# Patient Record
Sex: FEMALE | Race: WHITE | NOT HISPANIC OR LATINO | Employment: UNEMPLOYED | URBAN - METROPOLITAN AREA
[De-identification: names, ages, dates, MRNs, and addresses within clinical notes are randomized per-mention and may not be internally consistent; named-entity substitution may affect disease eponyms.]

---

## 2023-10-11 ENCOUNTER — OFFICE VISIT (OUTPATIENT)
Dept: FAMILY MEDICINE CLINIC | Facility: CLINIC | Age: 38
End: 2023-10-11
Payer: COMMERCIAL

## 2023-10-11 VITALS
OXYGEN SATURATION: 98 % | BODY MASS INDEX: 39.7 KG/M2 | DIASTOLIC BLOOD PRESSURE: 86 MMHG | WEIGHT: 247 LBS | TEMPERATURE: 99.2 F | HEART RATE: 90 BPM | HEIGHT: 66 IN | SYSTOLIC BLOOD PRESSURE: 126 MMHG

## 2023-10-11 DIAGNOSIS — J01.00 ACUTE NON-RECURRENT MAXILLARY SINUSITIS: Primary | ICD-10-CM

## 2023-10-11 PROCEDURE — 99213 OFFICE O/P EST LOW 20 MIN: CPT | Performed by: NURSE PRACTITIONER

## 2023-10-11 RX ORDER — AZITHROMYCIN 250 MG/1
TABLET, FILM COATED ORAL
Qty: 6 TABLET | Refills: 0 | Status: SHIPPED | OUTPATIENT
Start: 2023-10-11 | End: 2023-10-16

## 2023-10-11 NOTE — PROGRESS NOTES
[facility]        NAME: Angy Kennedy is a 41 y.o. female  : 1985    MRN: [id number]  DATE: 2023  TIME: 3:47 PM    Assessment and Plan   Acute non-recurrent maxillary sinusitis [J01.00]  1. Acute non-recurrent maxillary sinusitis  azithromycin (Zithromax) 250 mg tablet            Patient Instructions     Patient Instructions   Discussed viral vs allergic vs bacterial infections  OTC cough cold as directed  Allergy medication and Flonase as directed  Call or return if no improvement          Chief Complaint     Chief Complaint   Patient presents with    URI     Cold symptoms off & on for about 3 months         History of Present Illness       Thought she has sinus issues but the cough wont go away. Productive cough. Used OTC products with mild relief- dayqil and mucinex cold and flu. Denies fever. Review of Systems   Review of Systems   Constitutional:  Negative for activity change, diaphoresis, fatigue and fever. HENT:  Positive for congestion, sinus pressure, sinus pain and sore throat. Negative for facial swelling, hearing loss, rhinorrhea, sneezing and voice change. Eyes:  Negative for discharge and visual disturbance. Respiratory:  Positive for cough and shortness of breath. Negative for choking, chest tightness, wheezing and stridor. Cardiovascular:  Negative for chest pain, palpitations and leg swelling. Gastrointestinal:  Negative for abdominal distention, abdominal pain, constipation, diarrhea, nausea and vomiting. Endocrine: Negative for polydipsia, polyphagia and polyuria. Genitourinary:  Negative for difficulty urinating, dysuria, frequency and urgency. Musculoskeletal:  Negative for arthralgias, back pain, gait problem, joint swelling, myalgias, neck pain and neck stiffness. Skin:  Negative for color change, rash and wound. Neurological:  Positive for headaches.  Negative for dizziness, syncope, speech difficulty, weakness and light-headedness. Hematological:  Negative for adenopathy. Does not bruise/bleed easily. Psychiatric/Behavioral:  Negative for agitation, behavioral problems, confusion, hallucinations, sleep disturbance and suicidal ideas. The patient is not nervous/anxious. Current Medications       Current Outpatient Medications:     azithromycin (Zithromax) 250 mg tablet, Take 2 tablets (500 mg total) by mouth daily for 1 day, THEN 1 tablet (250 mg total) daily for 4 days. , Disp: 6 tablet, Rfl: 0    Current Allergies     Allergies as of 10/11/2023    (No Known Allergies)            The following portions of the patient's history were reviewed and updated as appropriate: allergies, current medications, past family history, past medical history, past social history, past surgical history and problem list.     History reviewed. No pertinent past medical history. Past Surgical History:   Procedure Laterality Date    APPENDECTOMY         Family History   Problem Relation Age of Onset    No Known Problems Daughter     No Known Problems Daughter     No Known Problems Son     No Known Problems Mother     No Known Problems Father     No Known Problems Sister     No Known Problems Brother     No Known Problems Sister     No Known Problems Sister     No Known Problems Sister     No Known Problems Brother     No Known Problems Brother     No Known Problems Brother     No Known Problems Brother          Medications have been verified. Objective   /86   Pulse 90   Temp 99.2 °F (37.3 °C) (Tympanic)   Ht 5' 5.5" (1.664 m)   Wt 112 kg (247 lb)   SpO2 98%   BMI 40.48 kg/m²        Physical Exam     Physical Exam  Vitals and nursing note reviewed. Constitutional:       General: She is not in acute distress. Appearance: She is well-developed. She is not diaphoretic. HENT:      Head: Normocephalic and atraumatic.       Right Ear: Tympanic membrane, ear canal and external ear normal.      Left Ear: Tympanic membrane, ear canal and external ear normal.      Nose:      Right Sinus: Maxillary sinus tenderness present. No frontal sinus tenderness. Left Sinus: Maxillary sinus tenderness present. No frontal sinus tenderness. Mouth/Throat:      Pharynx: Uvula midline. No oropharyngeal exudate. Eyes:      General:         Right eye: No discharge. Left eye: No discharge. Conjunctiva/sclera: Conjunctivae normal.      Pupils: Pupils are equal, round, and reactive to light. Neck:      Thyroid: No thyromegaly. Trachea: No tracheal deviation. Cardiovascular:      Rate and Rhythm: Normal rate and regular rhythm. Heart sounds: Normal heart sounds. No murmur heard. No friction rub. No gallop. Pulmonary:      Effort: Pulmonary effort is normal. No respiratory distress. Breath sounds: Normal breath sounds. No stridor. No wheezing, rhonchi or rales. Chest:      Chest wall: No tenderness. Abdominal:      General: Bowel sounds are normal. There is no distension. Palpations: Abdomen is soft. There is no mass. Tenderness: There is no abdominal tenderness. There is no guarding or rebound. Musculoskeletal:         General: No tenderness or deformity. Normal range of motion. Cervical back: Normal range of motion and neck supple. Lymphadenopathy:      Cervical: No cervical adenopathy. Skin:     General: Skin is warm and dry. Capillary Refill: Capillary refill takes less than 2 seconds. Coloration: Skin is not jaundiced or pale. Findings: No bruising, erythema, lesion or rash. Neurological:      Mental Status: She is alert and oriented to person, place, and time. Cranial Nerves: No cranial nerve deficit. Coordination: Coordination normal.   Psychiatric:         Speech: Speech normal.         Behavior: Behavior normal.         Thought Content:  Thought content normal.         Judgment: Judgment normal.

## 2023-10-11 NOTE — PATIENT INSTRUCTIONS
Discussed viral vs allergic vs bacterial infections  OTC cough cold as directed  Allergy medication and Flonase as directed  Call or return if no improvement

## 2023-10-12 ENCOUNTER — TELEPHONE (OUTPATIENT)
Dept: ADMINISTRATIVE | Facility: OTHER | Age: 38
End: 2023-10-12

## 2023-10-12 NOTE — TELEPHONE ENCOUNTER
Upon review of the In Basket request and the patient's chart, initial outreach has been made via fax to facility. Please see Contacts section for details.      Thank you  Cruzito Last

## 2023-10-12 NOTE — LETTER
Procedure Request Form: Cervical Cancer Screening      Date Requested: 10/12/23  Patient: Rob Looney  Patient : 1985   Referring Provider: Rubina Bowen MD        Date of Procedure ______________________________       The above patient has informed us that they have completed their   most recent Cervical Cancer Screening at your facility. Please complete   this form and attach all corresponding procedure reports/results. Comments __________________________________________________________  ____________________________________________________________________  ____________________________________________________________________  ____________________________________________________________________    Facility Completing Procedure _________________________________________    Form Completed By (print name) _______________________________________      Signature __________________________________________________________      These reports are needed for  compliance. Please fax this completed form and a copy of the procedure report to our office located at [address] [place] [address] as soon as possible to Fax [phone] attention Eileen Stinson: Phone [phone]    We thank you for your assistance in treating our mutual patient.

## 2023-10-12 NOTE — TELEPHONE ENCOUNTER
----- Message from Cheryl Roberts MA sent at 10/11/2023  3:30 PM EDT -----  Regarding: Cervical Cancer Screening Care Gap Request  10/11/23 3:30 PM    Hello, our patient attached above has had Pap Smear (HPV) aka Cervical Cancer Screening completed/performed. Please assist in updating the patient chart by making an External outreach to:    [address] [place] [address], [address] [place] [address],    [phone]    The date of service is approx 12/2021 or early 2022.     Thank you,  [facility]

## 2023-10-25 NOTE — TELEPHONE ENCOUNTER
As a follow-up, a second attempt has been made for outreach via fax to facility. Please see Contacts section for details.     Thank you  Berna Brothers

## 2023-11-08 NOTE — TELEPHONE ENCOUNTER
As a final attempt, a third outreach has been made via telephone call to facility. Please see Contacts section for details. This encounter will be closed and completed by end of day. Should we receive the requested information because of previous outreach attempts, the requested patient's chart will be updated appropriately.      Thank you  Karyn Screws

## 2024-08-02 ENCOUNTER — OFFICE VISIT (OUTPATIENT)
Dept: FAMILY MEDICINE CLINIC | Facility: CLINIC | Age: 39
End: 2024-08-02
Payer: COMMERCIAL

## 2024-08-02 ENCOUNTER — TELEPHONE (OUTPATIENT)
Age: 39
End: 2024-08-02

## 2024-08-02 VITALS — DIASTOLIC BLOOD PRESSURE: 80 MMHG | OXYGEN SATURATION: 98 % | HEART RATE: 76 BPM | SYSTOLIC BLOOD PRESSURE: 102 MMHG

## 2024-08-02 DIAGNOSIS — Z13.0 SCREENING FOR DEFICIENCY ANEMIA: ICD-10-CM

## 2024-08-02 DIAGNOSIS — F32.1 MODERATE MAJOR DEPRESSION, SINGLE EPISODE (HCC): Primary | ICD-10-CM

## 2024-08-02 DIAGNOSIS — Z13.228 SCREENING FOR METABOLIC DISORDER: ICD-10-CM

## 2024-08-02 DIAGNOSIS — G43.839 INTRACTABLE MENSTRUAL MIGRAINE WITHOUT STATUS MIGRAINOSUS: ICD-10-CM

## 2024-08-02 DIAGNOSIS — Z13.220 LIPID SCREENING: ICD-10-CM

## 2024-08-02 PROCEDURE — 3725F SCREEN DEPRESSION PERFORMED: CPT | Performed by: NURSE PRACTITIONER

## 2024-08-02 PROCEDURE — 99214 OFFICE O/P EST MOD 30 MIN: CPT | Performed by: NURSE PRACTITIONER

## 2024-08-02 RX ORDER — SUMATRIPTAN 50 MG/1
50 TABLET, FILM COATED ORAL ONCE AS NEEDED
Qty: 10 TABLET | Refills: 1 | Status: SHIPPED | OUTPATIENT
Start: 2024-08-02

## 2024-08-02 RX ORDER — ESCITALOPRAM OXALATE 5 MG/1
5 TABLET ORAL DAILY
Qty: 30 TABLET | Refills: 1 | Status: SHIPPED | OUTPATIENT
Start: 2024-08-02

## 2024-08-02 NOTE — PATIENT INSTRUCTIONS
"Start Lexapro 5 mg daily, return in 4 weeks for recheck.  Have fasting labs completed before next appointment.  Recommend therapy-referral placed to behavioral health.       Depression in adults   The Basics Written by the doctors and editors at [facility]   What is depression? -- Depression is a disorder that makes you sad, but it is different from normal sadness. Depression can make it hard for you to work, study, or do everyday tasks.  What causes depression? -- Depression is caused by problems with chemicals in the brain called \"neurotransmitters.\" Some people might be more likely to have depression if it runs in their family. Other things might also play a role, including hormones, certain health problems, medicines, stress, being mistreated as a child, family problems, and problems with friends or at school or work.  How do I know if I am depressed? -- People with depression feel down most of the time for at least 2 weeks. They also have at least 1 of these 2 symptoms:   They no longer enjoy or care about doing the things that they used to like to do.   They feel sad, down, hopeless, or cranky most of the day, almost every day.  People with depression can also have other symptoms. Examples include:   Changes in your appetite or weight. You might eat too little or too much, or gain or lose weight without trying.   Sleeping too much or too little   Feeling tired or like you have no energy   Feeling guilty, helpless, or like you are worth nothing   Trouble with concentration or memory   Acting restless or have trouble staying still, or moving or speaking more slowly than normal   Repeated thoughts of death or killing yourself  If you think that you might be depressed, see your doctor or nurse. Only someone trained in mental health can tell for sure if you are depressed.  How is depression diagnosed? -- Your doctor or nurse will do a physical exam, ask you questions, and might order tests. Depression can have a big " "impact on your life. Luckily, depression can be treated, and the sooner treatment is started, the better it works.  Get help right away if you are thinking of hurting or killing yourself! -- If you ever feel like you might hurt yourself or someone else, help is available:   In the [place], contact the [employer] Suicide & Crisis Lifeline:   To speak to someone, call or text [employer].   To talk to someone online, go to www.[employer].com.org/chat.   Call your doctor or nurse, and tell them it is urgent.   Call for an ambulance (in the [place] and [place], call 9-1-1).   Go to the emergency department at the nearest hospital.  What are the treatments for depression? -- Your doctor or nurse will work with you to make a treatment plan. Treatment can include:   Helping you learn more about depression   Counseling (with a psychiatrist, psychologist, nurse, or )   Medicines that relieve depression   Creating a plan to limit access to items that you might use to harm yourself   Other treatments that pass magnetic waves or electricity into the brain  In addition to treatment, getting regular physical activity can also help you feel better.  People with depression that is not too severe can get better by taking medicines or talking with a counselor. People with severe depression usually need medicines to get better, and might also need to see a counselor.  Another treatment involves placing a device against the scalp to pass magnetic waves into the brain. This is called \"transcranial magnetic stimulation\" (\"TMS\"). Doctors might suggest TMS if medicines and counseling have not helped.  Some people with severe depression might need a treatment called \"electroconvulsive therapy\" (\"ECT\"). During ECT, doctors pass an electric current through a person's brain in a safe way.  When will I feel better? -- Most treatment options take a little while to start working.   Many people who take medicines start to feel better within 2 weeks, but it might " be 4 to 8 weeks before the medicine has its full effect.   Many people who see a counselor start to feel better within a few weeks, but it might take 8 to 10 weeks to get the greatest benefit.  If the first treatment you try does not help you, tell your doctor or nurse, but do not give up. Some people need to try different treatments or combinations of treatments before they find an approach that works. Your doctor, nurse, or counselor can work with you to find the treatment that is right for you. They can also help you figure out how to cope while you search for the right treatment or are waiting for your treatment to start working.  How do I decide which treatment to have? -- You and your doctor or nurse will need to work together to choose a treatment for you. Medicines might work a little faster than counseling. But medicines can also cause side effects. Plus, some people do not like the idea of taking medicine.  Seeing a counselor involves talking about your feelings. That is also hard for some people.  What if I take medicine for depression and I want to have a baby? -- Some depression medicines can cause problems for an unborn baby. But having untreated depression during pregnancy can also cause problems. If you want to get pregnant, tell your doctor but do not stop taking your medicines. Together, you can plan the safest way for you to have your baby.  It's also important to talk with your doctor if you want to breastfeed after your baby is born. Breastfeeding has lots of benefits for both mother and baby. Some depression medicines are safer than others to use while breastfeeding. But having untreated depression after giving birth can also cause problems, so do not stop taking your medicines. Your doctor can work with you to plan the safest way for you to feed your baby.  All topics are updated as new evidence becomes available and our peer review process is complete.  This topic retrieved from [employer] on:  "Mar 06, 2024.  Topic 92630 Version 22.0  Release: 32.2.4 - C32.64  © 2024 UpToDate, Inc. and/or its affiliates. All rights reserved.  figure 1: Mood disorders caused by problems in the brain     Mooddisorders, such as depression and bipolar disorder, are caused by problems with\"neurotransmitters.\" These are chemicals in the brain that can affect your emotions.Treatments for mood disorders seem to work by changing the levels of certainneurotransmitters.  Graphic 64959 Version 4.0  Consumer Information Use and Disclaimer   Disclaimer: This generalized information is a limited summary of diagnosis, treatment, and/or medication information. It is not meant to be comprehensive and should be used as a tool to help the user understand and/or assess potential diagnostic and treatment options. It does NOT include all information about conditions, treatments, medications, side effects, or risks that may apply to a specific patient. It is not intended to be medical advice or a substitute for the medical advice, diagnosis, or treatment of a health care provider based on the health care provider's examination and assessment of a patient's specific and unique circumstances. Patients must speak with a health care provider for complete information about their health, medical questions, and treatment options, including any risks or benefits regarding use of medications. This information does not endorse any treatments or medications as safe, effective, or approved for treating a specific patient. UpToDate, Inc. and its affiliates disclaim any warranty or liability relating to this information or the use thereof.The use of this information is governed by the Terms of Use, available at https://www.[employer]tersHarbor Paymentsuwer.com/en/know/clinical-effectiveness-terms. 2024© UpToDate, Inc. and its affiliates and/or licensors. All rights reserved.  Copyright   © 2024 UpToDate, Inc. and/or its affiliates. All rights reserved.    "

## 2024-08-02 NOTE — ASSESSMENT & PLAN NOTE
Depression screening is positive for moderate depression. Start Lexapro 5 mg daily. Return in 4 weeks for recheck. Referral placed to behavioral health for therapy.

## 2024-08-02 NOTE — PROGRESS NOTES
"Ambulatory Visit  Name: Fuad Dudley      : 1985      MRN: [id number]  Encounter Provider: JOE Contreras  Encounter Date: 2024   Encounter department: [facility]    Assessment & Plan   1. Moderate major depression, single episode (HCC)  Assessment & Plan:  Depression screening is positive for moderate depression. Start Lexapro 5 mg daily. Return in 4 weeks for recheck. Referral placed to behavioral health for therapy.  Orders:  -     Ambulatory referral to Psych Services; Future  -     escitalopram (LEXAPRO) 5 mg tablet; Take 1 tablet (5 mg total) by mouth daily  -     TSH, 3rd generation with Free T4 reflex; Future  -     TSH, 3rd generation with Free T4 reflex  2. Screening for deficiency anemia  -     CBC and Platelet; Future  -     CBC and Platelet  3. Lipid screening  -     Lipid panel; Future  -     Lipid panel  4. Screening for metabolic disorder  -     Comprehensive metabolic panel; Future  -     Comprehensive metabolic panel  5. Intractable menstrual migraine without status migrainosus  Assessment & Plan:  No symptoms at present. Trial Imitrex.  Orders:  -     SUMAtriptan (IMITREX) 50 mg tablet; Take 1 tablet (50 mg total) by mouth once as needed for migraine for up to 1 dose may repeat in 2 hours if necessary      Depression Screening and Follow-up Plan: Patient's depression screening was positive with a PHQ-2 score of 4. Their PHQ-9 score was 13. Patient assessed for underlying major depression. Brief counseling provided and recommend additional follow-up/re-evaluation next office visit.       History of Present Illness     C/o depression-screening is positive for moderate depression. Feeling down, does not want to do anything or go anywhere. Denies suicidal thoughts, no self harm. She states loves her life and Family, just feeling down and\"Blah\".  She would like to try therapy.  A/so c/o migraines which occur monthly when she gets period-migraine " will last for hours-not able to function/work-no relief with OTC medications.        Review of Systems   Constitutional:  Negative for activity change and fever.   Eyes:  Negative for visual disturbance.   Respiratory:  Negative for chest tightness, shortness of breath and wheezing.    Cardiovascular:  Negative for chest pain.   Gastrointestinal:  Negative for abdominal pain.   Neurological:  Positive for headaches. Negative for dizziness and weakness.   Psychiatric/Behavioral:  Positive for dysphoric mood and sleep disturbance. Negative for self-injury and suicidal ideas. The patient is not nervous/anxious.        Objective     /80   Pulse 76   SpO2 98%     Physical Exam  Vitals reviewed.   Constitutional:       General: She is not in acute distress.     Appearance: Normal appearance. She is not ill-appearing, toxic-appearing or diaphoretic.   HENT:      Head: Normocephalic.   Eyes:      Extraocular Movements: Extraocular movements intact.   Cardiovascular:      Rate and Rhythm: Normal rate and regular rhythm.      Heart sounds: Normal heart sounds.   Pulmonary:      Effort: No respiratory distress.      Breath sounds: Normal breath sounds. No wheezing.   Musculoskeletal:      Cervical back: Normal range of motion and neck supple.   Neurological:      Mental Status: She is alert and oriented to person, place, and time.   Psychiatric:         Mood and Affect: Mood normal.         Behavior: Behavior normal.         Thought Content: Thought content normal.         Judgment: Judgment normal.           PHQ-2/9 Depression Screening    Little interest or pleasure in doing things: 2 - more than half the days  Feeling down, depressed, or hopeless: 2 - more than half the days  Trouble falling or staying asleep, or sleeping too much: 2 - more than half the days  Feeling tired or having little energy: 2 - more than half the days  Poor appetite or overeatin - several days  Feeling bad about yourself - or that you  are a failure or have let yourself or your family down: 2 - more than half the days  Trouble concentrating on things, such as reading the newspaper or watching television: 2 - more than half the days  Moving or speaking so slowly that other people could have noticed. Or the opposite - being so fidgety or restless that you have been moving around a lot more than usual: 0 - not at all  Thoughts that you would be better off dead, or of hurting yourself in some way: 0 - not at all  PHQ-2 Score: 4  PHQ-2 Interpretation: POSITIVE depression screen  PHQ-9 Score: 13  PHQ-9 Interpretation: Moderate depression           Depression Screening Follow-up Plan: Patient's depression screening was positive with a PHQ-2 score of 4. Their PHQ-9 score was 13. Patient assessed for underlying major depression. They have no active suicidal ideations. Brief counseling provided and recommend additional follow-up/re-evaluation next office visit.

## 2024-08-29 ENCOUNTER — TELEPHONE (OUTPATIENT)
Dept: FAMILY MEDICINE CLINIC | Facility: CLINIC | Age: 39
End: 2024-08-29

## 2024-08-30 ENCOUNTER — OFFICE VISIT (OUTPATIENT)
Dept: FAMILY MEDICINE CLINIC | Facility: CLINIC | Age: 39
End: 2024-08-30
Payer: COMMERCIAL

## 2024-08-30 VITALS
HEART RATE: 76 BPM | DIASTOLIC BLOOD PRESSURE: 70 MMHG | RESPIRATION RATE: 18 BRPM | HEIGHT: 66 IN | OXYGEN SATURATION: 98 % | BODY MASS INDEX: 39.05 KG/M2 | TEMPERATURE: 99.5 F | WEIGHT: 243 LBS | SYSTOLIC BLOOD PRESSURE: 122 MMHG

## 2024-08-30 DIAGNOSIS — F32.1 MODERATE MAJOR DEPRESSION, SINGLE EPISODE (HCC): ICD-10-CM

## 2024-08-30 DIAGNOSIS — Z00.00 WELLNESS EXAMINATION: Primary | ICD-10-CM

## 2024-08-30 DIAGNOSIS — G43.839 INTRACTABLE MENSTRUAL MIGRAINE WITHOUT STATUS MIGRAINOSUS: ICD-10-CM

## 2024-08-30 LAB
ALBUMIN SERPL-MCNC: 4.5 G/DL (ref 3.9–4.9)
ALP SERPL-CCNC: 64 IU/L (ref 44–121)
ALT SERPL-CCNC: 12 IU/L (ref 0–32)
AST SERPL-CCNC: 15 IU/L (ref 0–40)
BILIRUB SERPL-MCNC: 0.3 MG/DL (ref 0–1.2)
BUN SERPL-MCNC: 11 MG/DL (ref 6–20)
BUN/CREAT SERPL: 13 (ref 9–23)
CALCIUM SERPL-MCNC: 9.3 MG/DL (ref 8.7–10.2)
CHLORIDE SERPL-SCNC: 103 MMOL/L (ref 96–106)
CHOLEST SERPL-MCNC: 163 MG/DL (ref 100–199)
CHOLEST/HDLC SERPL: 3.5 RATIO (ref 0–4.4)
CO2 SERPL-SCNC: 25 MMOL/L (ref 20–29)
CREAT SERPL-MCNC: 0.86 MG/DL (ref 0.57–1)
EGFR: 90 ML/MIN/1.73
ERYTHROCYTE [DISTWIDTH] IN BLOOD BY AUTOMATED COUNT: 14.4 % (ref 11.7–15.4)
GLOBULIN SER-MCNC: 2.7 G/DL (ref 1.5–4.5)
GLUCOSE SERPL-MCNC: 82 MG/DL (ref 70–99)
HCT VFR BLD AUTO: 37.8 % (ref 34–46.6)
HDLC SERPL-MCNC: 46 MG/DL
HGB BLD-MCNC: 11.8 G/DL (ref 11.1–15.9)
LDLC SERPL CALC-MCNC: 101 MG/DL (ref 0–99)
MCH RBC QN AUTO: 26.9 PG (ref 26.6–33)
MCHC RBC AUTO-ENTMCNC: 31.2 G/DL (ref 31.5–35.7)
MCV RBC AUTO: 86 FL (ref 79–97)
PLATELET # BLD AUTO: 282 X10E3/UL (ref 150–450)
POTASSIUM SERPL-SCNC: 4.6 MMOL/L (ref 3.5–5.2)
PROT SERPL-MCNC: 7.2 G/DL (ref 6–8.5)
RBC # BLD AUTO: 4.38 X10E6/UL (ref 3.77–5.28)
SL AMB VLDL CHOLESTEROL CALC: 16 MG/DL (ref 5–40)
SODIUM SERPL-SCNC: 138 MMOL/L (ref 134–144)
TRIGL SERPL-MCNC: 87 MG/DL (ref 0–149)
TSH SERPL DL<=0.005 MIU/L-ACNC: 0.63 UIU/ML (ref 0.45–4.5)
WBC # BLD AUTO: 7.7 X10E3/UL (ref 3.4–10.8)

## 2024-08-30 PROCEDURE — 99395 PREV VISIT EST AGE 18-39: CPT

## 2024-08-30 PROCEDURE — 99214 OFFICE O/P EST MOD 30 MIN: CPT

## 2024-08-30 RX ORDER — ESCITALOPRAM OXALATE 10 MG/1
10 TABLET ORAL DAILY
Qty: 90 TABLET | Refills: 2 | Status: SHIPPED | OUTPATIENT
Start: 2024-08-30

## 2024-08-30 NOTE — PROGRESS NOTES
Adult Annual Physical  Name: Fuad Dudley      : 1985      MRN: [id number]  Encounter Provider: JOE Conner  Encounter Date: 2024   Encounter department: [facility]    Assessment & Plan   1. Wellness examination  2. Moderate major depression, single episode (HCC)  Assessment & Plan:  Reports that she and her family have noticed a difference in mood. Reports more energy. Continues with significant PMDD symptoms--- willing to trial increase in lexapro to 10 mg. Discussed HRT as an option to try decrease PMDD symptoms. Declines OBGYN at this time.   Orders:  -     escitalopram (LEXAPRO) 10 mg tablet; Take 1 tablet (10 mg total) by mouth daily  3. Intractable menstrual migraine without status migrainosus  Assessment & Plan:  Reports that she gets a monthly migraine associated with her period. States that she tried imitrex this past month and it worked well. States that she took imitrex at onset of symptoms and it resolved symptoms. As patient only gets a migraine 1x/month with period, prophylactic meds not necessary. Discussed if frequency increases that there are meds to prevent migraines. Patient verbalized understanding and is agreeable to continuing prophylactic meds only for now.     Immunizations and preventive care screenings were discussed with patient today. Appropriate education was printed on patient's after visit summary.    Counseling:  Exercise: the importance of regular exercise/physical activity was discussed. Recommend exercise 3-5 times per week for at least 30 minutes.          History of Present Illness     Adult Annual Physical:  Patient presents for annual physical.     Diet and Physical Activity:  - Diet/Nutrition: well balanced diet.  - Exercise: walking.    General Health:  - Sleep: 4-6 hours of sleep on average.  - Hearing: normal hearing bilateral ears.  - Vision: goes for regular eye exams.  - Dental: regular dental  "visits.    /GYN Health:  - Follows with GYN: no.   - Menopause: premenopausal.   - History of STDs: no    Advanced Care Planning:  - Has an advanced directive?: no    - Has a durable medical POA?: no    - ACP document given to patient?: no      Review of Systems   Constitutional:  Negative for activity change, fatigue and fever.   HENT:  Negative for congestion, ear pain, rhinorrhea and sore throat.    Eyes:  Negative for pain.   Respiratory:  Negative for cough, shortness of breath and wheezing.    Cardiovascular:  Negative for chest pain and leg swelling.   Gastrointestinal:  Negative for abdominal pain, diarrhea, nausea and vomiting.   Musculoskeletal:  Negative for arthralgias and myalgias.   Skin:  Negative for rash.   Neurological:  Positive for headaches. Negative for dizziness, weakness and numbness.   Psychiatric/Behavioral:  Positive for agitation. Negative for dysphoric mood and suicidal ideas. The patient is not nervous/anxious.    All other systems reviewed and are negative.    Medical History Reviewed by provider this encounter:  Tobacco  Allergies  Meds  Problems  Med Hx  Surg Hx  Fam Hx       Current Outpatient Medications on File Prior to Visit   Medication Sig Dispense Refill    SUMAtriptan (IMITREX) 50 mg tablet Take 1 tablet (50 mg total) by mouth once as needed for migraine for up to 1 dose may repeat in 2 hours if necessary 10 tablet 1     No current facility-administered medications on file prior to visit.        Objective     /70   Pulse 76   Temp 99.5 °F (37.5 °C)   Resp 18   Ht 5' 5.5\" (1.664 m)   Wt 110 kg (243 lb)   LMP 08/04/2024   SpO2 98%   Breastfeeding No   BMI 39.82 kg/m²     Physical Exam  Vitals and nursing note reviewed.   Constitutional:       General: She is not in acute distress.     Appearance: Normal appearance. She is well-developed. She is not ill-appearing.   HENT:      Head: Normocephalic and atraumatic.      Right Ear: Tympanic membrane, ear " canal and external ear normal. There is no impacted cerumen.      Left Ear: Tympanic membrane, ear canal and external ear normal. There is no impacted cerumen.      Nose: Nose normal.      Mouth/Throat:      Mouth: Mucous membranes are moist.      Pharynx: No oropharyngeal exudate or posterior oropharyngeal erythema.   Cardiovascular:      Rate and Rhythm: Normal rate and regular rhythm.      Heart sounds: No murmur heard.  Pulmonary:      Effort: Pulmonary effort is normal. No respiratory distress.      Breath sounds: Normal breath sounds.   Abdominal:      General: Bowel sounds are normal. There is no distension.      Palpations: Abdomen is soft.      Tenderness: There is no abdominal tenderness.   Musculoskeletal:      Cervical back: Neck supple.   Skin:     General: Skin is warm and dry.      Capillary Refill: Capillary refill takes less than 2 seconds.   Neurological:      Mental Status: She is alert and oriented to person, place, and time. Mental status is at baseline.   Psychiatric:         Attention and Perception: Attention and perception normal.         Mood and Affect: Mood and affect normal. Mood is not anxious or depressed.         Speech: Speech normal.         Behavior: Behavior normal. Behavior is cooperative.         Thought Content: Thought content normal.         Judgment: Judgment normal.       Administrative Statements   I have spent a total time of 30 minutes in caring for this patient on the day of the visit/encounter including Diagnostic results, Risks and benefits of tx options, Instructions for management, Patient and family education, Importance of tx compliance, Risk factor reductions, Impressions, Documenting in the medical record, Reviewing / ordering tests, medicine, procedures  , and Obtaining or reviewing history  .

## 2024-09-02 NOTE — ASSESSMENT & PLAN NOTE
Reports that she and her family have noticed a difference in mood. Reports more energy. Continues with significant PMDD symptoms--- willing to trial increase in lexapro to 10 mg. Discussed HRT as an option to try decrease PMDD symptoms. Declines OBGYN at this time.

## 2024-09-02 NOTE — ASSESSMENT & PLAN NOTE
Reports that she gets a monthly migraine associated with her period. States that she tried imitrex this past month and it worked well. States that she took imitrex at onset of symptoms and it resolved symptoms. As patient only gets a migraine 1x/month with period, prophylactic meds not necessary. Discussed if frequency increases that there are meds to prevent migraines. Patient verbalized understanding and is agreeable to continuing prophylactic meds only for now.

## 2024-09-03 ENCOUNTER — TELEPHONE (OUTPATIENT)
Dept: ADMINISTRATIVE | Facility: OTHER | Age: 39
End: 2024-09-03

## 2024-09-03 NOTE — TELEPHONE ENCOUNTER
----- Message from Zoe RASMUSSEN sent at 8/30/2024  4:43 PM EDT -----  08/30/24 4:43 PM    Hello, our patient Fuad Dudley has had Pap Smear (HPV) aka Cervical Cancer Screening completed/performed. Please assist in updating the patient chart by making an External outreach to [facility] located in [facility]. The date of service is about 2 years ago.    Thank you,  Zoe Malhotra MA  [facility]

## 2024-09-03 NOTE — LETTER
Procedure Request Form: Cervical Cancer Screening      Date Requested: 24  Patient: Fuad Dudley  Patient : 1985   Referring Provider: Nicholas Weaver MD        Date of Procedure ______________________________       The above patient has informed us that they have completed their   most recent Cervical Cancer Screening at your facility. Please complete   this form and attach all corresponding procedure reports/results.    Comments __________________________________________________________  ____________________________________________________________________  ____________________________________________________________________  ____________________________________________________________________    Facility Completing Procedure _________________________________________    Form Completed By (print name) _______________________________________      Signature __________________________________________________________      These reports are needed for  compliance.    Please fax this completed form and a copy of the procedure report to our office located at [address] [place] [address] as soon as possible to Fax [phone] cathi Kendall: Phone [phone]    We thank you for your assistance in treating our mutual patient.

## 2024-09-03 NOTE — LETTER
Procedure Request Form: Cervical Cancer Screening      Date Requested: 09/10/24  Patient: Fuad Dudley  Patient : 1985   Referring Provider: Nicholas Weaver MD        Date of Procedure ______________________________       The above patient has informed us that they have completed their   most recent Cervical Cancer Screening at your facility. Please complete   this form and attach all corresponding procedure reports/results.    Comments __________________________________________________________  ____________________________________________________________________  ____________________________________________________________________  ____________________________________________________________________    Facility Completing Procedure _________________________________________    Form Completed By (print name) _______________________________________      Signature __________________________________________________________      These reports are needed for  compliance.    Please fax this completed form and a copy of the procedure report to our office located at [address] [place] [address] as soon as possible to Fax [phone] cathi Kendall: Phone [phone]    We thank you for your assistance in treating our mutual patient.

## 2024-09-04 NOTE — TELEPHONE ENCOUNTER
Upon review of the In Basket request and the patient's chart, initial outreach has been made via fax to facility. Please see Contacts section for details.     Thank you  Faiza Graves MA

## 2024-09-10 NOTE — TELEPHONE ENCOUNTER
As a follow-up, a second attempt has been made for outreach via fax to facility. Please see Contacts section for details.    Thank you  Faiza Graves MA

## 2024-09-13 NOTE — TELEPHONE ENCOUNTER
As a final attempt, a third outreach has been made via telephone call to facility. Please see Contacts section for details. This encounter will be closed and completed by end of day. Should we receive the requested information because of previous outreach attempts, the requested patient's chart will be updated appropriately.     Thank you  Faiza Graves MA

## 2024-10-02 ENCOUNTER — ULTRASOUND (OUTPATIENT)
Dept: OBGYN CLINIC | Facility: CLINIC | Age: 39
End: 2024-10-02
Payer: COMMERCIAL

## 2024-10-02 VITALS
HEIGHT: 66 IN | DIASTOLIC BLOOD PRESSURE: 60 MMHG | BODY MASS INDEX: 39.82 KG/M2 | SYSTOLIC BLOOD PRESSURE: 120 MMHG | WEIGHT: 247.8 LBS

## 2024-10-02 DIAGNOSIS — N91.2 AMENORRHEA: Primary | ICD-10-CM

## 2024-10-02 PROCEDURE — 76817 TRANSVAGINAL US OBSTETRIC: CPT | Performed by: NURSE PRACTITIONER

## 2024-10-02 PROCEDURE — 99203 OFFICE O/P NEW LOW 30 MIN: CPT | Performed by: NURSE PRACTITIONER

## 2024-10-02 NOTE — PROGRESS NOTES
[facility] [place]  [address] [place] [address]    Assessment/Plan:  1. Amenorrhea  -     AMB US OB < 14 weeks single or first gestation level 1  -     Ambulatory Referral to Maternal Fetal Medicine; Future; Expected date: 10/03/2024    7 weeks 5 days gestation, GOOD 2025 based upon today's ultrasound    9 weeks 1 day by approximate LMP, therefore GOOD adjusted based upon today's ultrasound, 10 days difference from approximate LMP.    Discussed use of SSRIs in pregnancy, pt currently off Lexapro but wishes to restart. Reassured patient of general safety and advised to take if needed.   Schedule 12 week ultrasound with MFM  Return for OB intake in 2 weeks.    Subjective:   Fuad Dudley is a 39 y.o.  female.    HPI:   39 year old  presents for office visit with amenorrhea and a + HPT on . Pt reports approximate LMP 2025 with regular cycles that are monthly. Feels well and has no complaints today. Unplanned pregnancy but happy.         Gyn History  Patient's last menstrual period was 2024 (approximate).       Last pap smear: Not on file    She  reports being sexually active and has had partner(s) who are male.       OB History      No past medical history on file.     Past Surgical History:  No date: APPENDECTOMY     Social History     Tobacco Use    Smoking status: Never    Smokeless tobacco: Never   Vaping Use    Vaping status: Never Used   Substance Use Topics    Alcohol use: Never    Drug use: Never          Current Outpatient Medications:     Prenatal MV-Min-Fe Fum-FA-DHA (PRENATAL+DHA PO), Take by mouth, Disp: , Rfl:     SUMAtriptan (IMITREX) 50 mg tablet, Take 1 tablet (50 mg total) by mouth once as needed for migraine for up to 1 dose may repeat in 2 hours if necessary, Disp: 10 tablet, Rfl: 1    escitalopram (LEXAPRO) 10 mg tablet, Take 1 tablet (10 mg total) by mouth daily (Patient not taking: Reported on 10/2/2024), Disp: 90 tablet, Rfl: 2    She  "has No Known Allergies..    ROS: Review of Systems See HPI for details, otherwise negative    Objective:  /60 (BP Location: Left arm, Patient Position: Sitting, Cuff Size: Large)   Ht 5' 5.75\" (1.67 m)   Wt 112 kg (247 lb 12.8 oz)   LMP 07/30/2024 (Approximate)   BMI 40.30 kg/m²      Physical Exam  Constitutional:       General: She is not in acute distress.     Appearance: Normal appearance. She is not ill-appearing.   HENT:      Head: Normocephalic and atraumatic.   Abdominal:      General: There is no distension.      Palpations: Abdomen is soft.      Tenderness: There is no abdominal tenderness.   Skin:     General: Skin is warm and dry.   Neurological:      Mental Status: She is alert.   Psychiatric:         Thought Content: Thought content normal.       TVUS demonstrates a SIUP measuring 7 weeks 5 days by mean CRL, + CM noted at 161 bpm.   "

## 2024-10-11 ENCOUNTER — OFFICE VISIT (OUTPATIENT)
Dept: FAMILY MEDICINE CLINIC | Facility: CLINIC | Age: 39
End: 2024-10-11
Payer: COMMERCIAL

## 2024-10-11 VITALS
WEIGHT: 242 LBS | OXYGEN SATURATION: 97 % | BODY MASS INDEX: 39.36 KG/M2 | HEART RATE: 88 BPM | TEMPERATURE: 96.9 F | DIASTOLIC BLOOD PRESSURE: 82 MMHG | SYSTOLIC BLOOD PRESSURE: 116 MMHG

## 2024-10-11 DIAGNOSIS — F32.1 MODERATE MAJOR DEPRESSION, SINGLE EPISODE (HCC): ICD-10-CM

## 2024-10-11 DIAGNOSIS — J01.00 ACUTE NON-RECURRENT MAXILLARY SINUSITIS: Primary | ICD-10-CM

## 2024-10-11 PROCEDURE — 99214 OFFICE O/P EST MOD 30 MIN: CPT

## 2024-10-11 RX ORDER — PREDNISONE 20 MG/1
TABLET ORAL
Qty: 15 TABLET | Refills: 0 | Status: SHIPPED | OUTPATIENT
Start: 2024-10-11

## 2024-10-11 RX ORDER — AMOXICILLIN 500 MG/1
500 CAPSULE ORAL EVERY 8 HOURS SCHEDULED
Qty: 21 CAPSULE | Refills: 0 | Status: SHIPPED | OUTPATIENT
Start: 2024-10-11 | End: 2024-10-18

## 2024-10-11 RX ORDER — ESCITALOPRAM OXALATE 10 MG/1
10 TABLET ORAL DAILY
Qty: 90 TABLET | Refills: 2 | Status: SHIPPED | OUTPATIENT
Start: 2024-10-11

## 2024-10-11 NOTE — PROGRESS NOTES
Ambulatory Visit  Name: Fuad Dudley      : 1985      MRN: [id number]  Encounter Provider: JOE Conner  Encounter Date: 10/11/2024   Encounter department: [facility]    Assessment & Plan  Acute non-recurrent maxillary sinusitis  Patient to start with amoxicillin and take tylenol as needed for pain/fevers. Will only take prednisone (reviewed risk/benefit during pregnancy) if pain and inflammation persist.   Orders:    predniSONE 20 mg tablet; TAKE 1 TABLET BID X 5 DAYS THEN TAKE 1 TABLET QD FOR 5 DAYS    amoxicillin (AMOXIL) 500 mg capsule; Take 1 capsule (500 mg total) by mouth every 8 (eight) hours for 7 days    Moderate major depression, single episode (HCC)  Depression Screening Follow-up Plan: Patient's depression screening was positive with a PHQ-9 score of 9. Patient with underlying depression and was advised to continue current medications as prescribed.  Patient reports that she stopped taking her lexapro when she found out she was pregnant approx. 1 month ago. States that her mood has been poor and depressive symptoms have increased. Reviewed risk vs benefit of SSRI use during pregnancy. Patient states that she is going to resume the lexapro.  Orders:    escitalopram (LEXAPRO) 10 mg tablet; Take 1 tablet (10 mg total) by mouth daily      Depression Screening and Follow-up Plan: Patient's depression screening was positive with a PHQ-9 score of 9. Patient with underlying depression and was advised to continue current medications as prescribed.       History of Present Illness     Sinus Problem  This is a new problem. The current episode started in the past 7 days. The problem has been gradually worsening since onset. There has been no fever. Her pain is at a severity of 7/10. Associated symptoms include congestion, headaches and sinus pressure. Pertinent negatives include no chills, coughing, diaphoresis, ear pain, hoarse voice, neck pain, shortness  of breath, sneezing, sore throat or swollen glands. Past treatments include acetaminophen. The treatment provided no relief.       History obtained from : patient  Review of Systems   Constitutional:  Positive for fatigue. Negative for activity change, chills, diaphoresis and fever.   HENT:  Positive for congestion and sinus pressure. Negative for dental problem, ear pain, hoarse voice, postnasal drip, rhinorrhea, sneezing and sore throat.    Eyes:  Negative for pain.   Respiratory:  Negative for cough, shortness of breath and wheezing.    Cardiovascular:  Negative for chest pain and leg swelling.   Gastrointestinal:  Negative for abdominal distention, abdominal pain, diarrhea, nausea and vomiting.   Musculoskeletal:  Negative for arthralgias, myalgias and neck pain.   Skin:  Negative for rash.   Neurological:  Positive for headaches. Negative for dizziness, weakness and numbness.   All other systems reviewed and are negative.    Medical History Reviewed by provider this encounter:  Tobacco  Allergies  Meds  Problems  Med Hx  Surg Hx  Fam Hx       Current Outpatient Medications on File Prior to Visit   Medication Sig Dispense Refill    Prenatal MV-Min-Fe Fum-FA-DHA (PRENATAL+DHA PO) Take by mouth      SUMAtriptan (IMITREX) 50 mg tablet Take 1 tablet (50 mg total) by mouth once as needed for migraine for up to 1 dose may repeat in 2 hours if necessary 10 tablet 1    [DISCONTINUED] escitalopram (LEXAPRO) 10 mg tablet Take 1 tablet (10 mg total) by mouth daily (Patient not taking: Reported on 10/2/2024) 90 tablet 2     No current facility-administered medications on file prior to visit.          Objective     /82 (BP Location: Left arm, Patient Position: Sitting, Cuff Size: Standard)   Pulse 88   Temp (!) 96.9 °F (36.1 °C) (Tympanic)   Wt 110 kg (242 lb)   LMP 07/30/2024 (Approximate)   SpO2 97%   BMI 39.36 kg/m²     Physical Exam  Vitals and nursing note reviewed.   Constitutional:       General:  She is not in acute distress.     Appearance: She is well-developed. She is not ill-appearing.   HENT:      Head: Normocephalic and atraumatic.      Right Ear: Tympanic membrane, ear canal and external ear normal. There is no impacted cerumen.      Left Ear: Tympanic membrane, ear canal and external ear normal. There is no impacted cerumen.      Nose: Congestion present.      Right Sinus: Maxillary sinus tenderness present.      Left Sinus: Maxillary sinus tenderness present.      Mouth/Throat:      Mouth: Mucous membranes are moist.      Pharynx: No oropharyngeal exudate or posterior oropharyngeal erythema.   Cardiovascular:      Rate and Rhythm: Normal rate and regular rhythm.      Heart sounds: Normal heart sounds. No murmur heard.  Pulmonary:      Effort: Pulmonary effort is normal. No respiratory distress.      Breath sounds: Normal breath sounds.   Abdominal:      General: Bowel sounds are normal. There is no distension.      Palpations: Abdomen is soft.      Tenderness: There is no abdominal tenderness.   Musculoskeletal:      Cervical back: Neck supple.   Skin:     General: Skin is warm and dry.      Capillary Refill: Capillary refill takes less than 2 seconds.   Neurological:      Mental Status: She is alert and oriented to person, place, and time. Mental status is at baseline.   Psychiatric:         Mood and Affect: Mood normal.         Behavior: Behavior normal.       Administrative Statements   I have spent a total time of 20 minutes in caring for this patient on the day of the visit/encounter including Risks and benefits of tx options, Instructions for management, Patient and family education, Importance of tx compliance, Risk factor reductions, Impressions, Documenting in the medical record, Reviewing / ordering tests, medicine, procedures  , and Obtaining or reviewing history  .

## 2024-10-11 NOTE — ASSESSMENT & PLAN NOTE
Depression Screening Follow-up Plan: Patient's depression screening was positive with a PHQ-9 score of 9. Patient with underlying depression and was advised to continue current medications as prescribed.  Patient reports that she stopped taking her lexapro when she found out she was pregnant approx. 1 month ago. States that her mood has been poor and depressive symptoms have increased. Reviewed risk vs benefit of SSRI use during pregnancy. Patient states that she is going to resume the lexapro.  Orders:    escitalopram (LEXAPRO) 10 mg tablet; Take 1 tablet (10 mg total) by mouth daily

## 2024-10-16 NOTE — PATIENT INSTRUCTIONS
Lorrie Dudley,     It was so nice getting to know you today. Remember if you have an urgent or time sensitive concern, please call the practice phone number so a clinical triage team member can review your symptoms and get in touch with our on call provider if necessary. If you have general questions or need help navigating our services please REPLY to this message so it comes directly to me and I will respond between other patients. If I am out of the office for any reason, another nurse navigator may reach out to help you. Our Pregnancy Essential Guide is a great online resource--please use the link below.     [facility] Pregnancy Essentials Guide  [facility] ([email or web address])     Again, Congratulations and Thank You for choosing [facility]. I look forward to helping you through this journey.

## 2024-10-16 NOTE — PROGRESS NOTES
OB INTAKE INTERVIEW  Patient is 39 y.o. who presents for OB intake at 9.6wks  She is accompanied by herself  during this encounter  The father of her baby (Ventura) is involved in the pregnancy and is 40 years old.      Last Menstrual Period: 2024 (approximate)   Reports her cycles are regular, every 28-30 days   Ultrasound: Measured 7 weeks 5 days on 10/2/24  Estimated Date of Delivery: 2024 dating based upon US 10/2/24    Signs/Symptoms of Pregnancy  Current pregnancy symptoms:   NONE   Constipation no  Headaches no  Cramping/spotting no  PICA cravings no    Diabetes-  There is no height or weight on file to calculate BMI.  If patient has 1 or more, please order early 1 hour GTT  History of GDM no  BMI >35 YES, 39.87  History of PCOS or current metformin use no  History of LGA/macrosomic infant (4000g/9lbs) no  Birth weights ranging 6lbs 7oz  to 7 lb 6 oz.    If patient has 2 or more, please order early 1 hour GTT  BMI>30 YES, 39.87  AMA YES  First degree relative with type 2 diabetes no  History of chronic HTN, hyperlipidemia, elevated A1C no  High risk race (, , ,  or ) no    Hypertension- if you answer yes to any of the following, please order baseline preeclampsia labs (cbc, comprehensive metabolic panel, urine protein creatinine ratio, uric acid)  History of of chronic HTN no  History of gestational HTN Denies   History of preeclampsia, eclampsia, or HELLP syndrome no,denies   History of diabetes no  History of lupus, autoimmune disease, kidney disease no    Thyroid- if yes order TSH with reflex T4  History of thyroid disease no    Bleeding Disorder or Hx of DVT-patient or first degree relative with history of. Order the following if not done previously.   (Factor V, antithrombin III, prothrombin gene mutation, protein C and S Ag, lupus anticoagulant, anticardiolipin, beta-2 glycoprotein)   no    OB/GYN-  History of abnormal pap smear  no Denies       Date of last pap smear No current pap on file.   History of HPV no  History of Herpes/HSV no  History of other STI (gonorrhea, chlamydia, trich) no  History of prior  YES, x4   History of prior  no  History of  delivery prior to 36 weeks 6 days no  History of blood transfusion no  Ok for blood transfusion yes    Substance screening-   History of tobacco use no  Currently using tobacco no  Substance Use Screen Level NO Risk     MRSA Screening-   Does the pt have a hx of MRSA? no    Immunizations:  Influenza vaccine given this season NO   Discussed Tdap vaccine yes  Discussed COVID Vaccine yes    Genetic/MFM-  Do you or your partner have a history of any of the following in yourselves or first degree relatives?  Cystic fibrosis no  Spinal muscular atrophy no  Hemoglobinopathy/Sickle Cell/Thalassemia no  Fragile X Intellectual Disability no    If yes, discuss Carrier Screening and recommend consultation with MFM/Genetic Counseling and place specific [facility] Referral for.    If no, discuss Carrier Screening being completed once in a lifetime as a standard of care lab test. Place orders for Cystic Fibrosis Gene Test (BQK172) and Spinal Muscular Atrophy DNA ([id number])  20-CF/SMA carrier screening -Negative     Appointment for Nuchal Translucency Ultrasound at [facility] scheduled for 24      Interview education  [facility] Pregnancy Essentials Book reviewed, discussed and attached to their AVS yes      Ambulatory Referral to  placed  EPDS: 10-History of depression-Managed with Lexapro, stopped with knowledge of pregnancy. Pt would like to consider restarting, feels better when on medication. Seen on 10/2/24- NP reassured patient of general safety and advised to take if needed. Would like to resume taking Lexapro 10 mg daily.     Prenatal lab work scripts YES  Preferred Lab: [facility]   Extra labs ordered:  Early 1 hour    Aspirin/Preeclampsia Screen    Risk Level Risk Factor  Recommendation   LOW Prior Uncomplicated full-term delivery YES No Aspirin recommendation        MODERATE Nulliparity no Recommend low-dose aspirin if     BMI>30 YES 39.87 2 or more moderate risk factors    Family History Preeclampsia (mother/sister) no     35yr old or greater YES     Black Race, Concern for SDOH/Low Socioeconomic no     IVF Pregnancy  no     Personal History Risks (low birth weight, prior adverse preg outcome, >10yr preg interval) no         HIGH History of Preeclampsia no Recommend low-dose aspirin if     Multifetal gestation no 1 or more high risk factors    Chronic HTN no     Type 1 or 2 Diabetes no     Renal Disease no     Autoimmune Disease  no      Contraindications to ASA therapy:  NSAID/ ASA allergy: no  Nasal polyps: no  Asthma with history of ASA induced bronchospasm: no  Relative contraindications:  History of GI bleed: no  Active peptic ulcer disease: no  Severe hepatic dysfunction: no    Patient should be recommended to take ASA 162mg during this pregnancy from 12-36wks to lower her risk of preeclampsia;  Risk factors include AMA and BMI (39.87)-initiation of low dose ASA is recommended and to be discussed with patient by provider.       The patient has a history now or in prior pregnancy notable for:  See Below       Details that I feel the provider should be aware of:   Not all prior OB records were available at time of intake, reception will attempt to obtain prior OB/GYN records.   Fuad is a new patient to [facility] she was a prior patient of [facility].  This is her fifth pregnancy. This was an unplanned pregnancy but welcomed. Her pregnancy history includes four term vaginal deliveries, uncomplicated pregnancy course. (2007, 2009, 2019 and 2021).  AMA  BMI-39.87-early 1 hour gtt ordered   History of depression-Managed with Lexapro, stopped with knowledge of pregnancy. Pt would like to consider restarting, feels better when on medication. Seen on 10/2/24- NP reassured patient  of general safety and advised to take if needed. Would like to resume taking Lexapro 10 mg daily.   Menstrual migraines-Menstrual cycle related-previously treated with Imitrex as needed. Not  currently having any migraines or headaches with pregnancy.   History Appendectomy    **Has farm animals/cat, chickens, horses/donkies) -toxoplasmosis precautions provided.   PN1 visit scheduled. The patient was oriented to our practice, the navigator role, reviewed delivering physicians and [facility] for Delivery. All questions were answered.    Interviewed by: MELVIN Lang RN

## 2024-10-17 ENCOUNTER — INITIAL PRENATAL (OUTPATIENT)
Dept: OBGYN CLINIC | Facility: CLINIC | Age: 39
End: 2024-10-17

## 2024-10-17 ENCOUNTER — PATIENT OUTREACH (OUTPATIENT)
Dept: OBGYN CLINIC | Facility: CLINIC | Age: 39
End: 2024-10-17

## 2024-10-17 VITALS
SYSTOLIC BLOOD PRESSURE: 132 MMHG | HEIGHT: 66 IN | BODY MASS INDEX: 39.7 KG/M2 | WEIGHT: 247 LBS | DIASTOLIC BLOOD PRESSURE: 70 MMHG

## 2024-10-17 DIAGNOSIS — O99.211 SEVERE OBESITY DUE TO EXCESS CALORIES AFFECTING PREGNANCY IN FIRST TRIMESTER (HCC): ICD-10-CM

## 2024-10-17 DIAGNOSIS — Z36.89 ENCOUNTER FOR OTHER SPECIFIED ANTENATAL SCREENING: Primary | ICD-10-CM

## 2024-10-17 DIAGNOSIS — Z3A.09 9 WEEKS GESTATION OF PREGNANCY: ICD-10-CM

## 2024-10-17 DIAGNOSIS — E66.01 SEVERE OBESITY DUE TO EXCESS CALORIES AFFECTING PREGNANCY IN FIRST TRIMESTER (HCC): ICD-10-CM

## 2024-10-17 PROCEDURE — NOBC

## 2024-10-17 NOTE — PROGRESS NOTES
SW referred for initial prenatal assessment. Patient is , [id number] with an GOOD of 25. Patient agreeable to meeting with SW today.    Patient reports this pregnancy is unplanned but welcomed. She and FOB both work and drive. Patient denies needing information for MA/SNAP, parenting education, or baby supply resources. Is interested in ACP info, WIC info, and Free Formula Exchange - sent via email/Find Help.     Patient denies current or h/o substance use, DV/IPV, CYS involvement, and legal concerns. Reports h/o depression - stopped meds (through PCP) for pregnancy but plans to restart. Feels depression score is elevated today due to stopping medication - feels more agitated/on edge. Denies SI/HI. May consider therapy postpartum. Has good support from FOB and friends. Enjoys reading for stress relief.    No other SW needs identified at this time, SW closing referral. Please re-refer as needed.

## 2024-10-31 ENCOUNTER — INITIAL PRENATAL (OUTPATIENT)
Dept: OBGYN CLINIC | Facility: CLINIC | Age: 39
End: 2024-10-31
Payer: COMMERCIAL

## 2024-10-31 VITALS — BODY MASS INDEX: 38.87 KG/M2 | SYSTOLIC BLOOD PRESSURE: 122 MMHG | WEIGHT: 240.8 LBS | DIASTOLIC BLOOD PRESSURE: 60 MMHG

## 2024-10-31 DIAGNOSIS — O09.529 ANTEPARTUM MULTIGRAVIDA OF ADVANCED MATERNAL AGE: ICD-10-CM

## 2024-10-31 DIAGNOSIS — Z3A.11 11 WEEKS GESTATION OF PREGNANCY: ICD-10-CM

## 2024-10-31 DIAGNOSIS — O99.340 DEPRESSION AFFECTING PREGNANCY, ANTEPARTUM: ICD-10-CM

## 2024-10-31 DIAGNOSIS — F32.A DEPRESSION AFFECTING PREGNANCY, ANTEPARTUM: ICD-10-CM

## 2024-10-31 DIAGNOSIS — B37.2 SKIN YEAST INFECTION: ICD-10-CM

## 2024-10-31 DIAGNOSIS — Z23 NEED FOR INFLUENZA VACCINATION: Primary | ICD-10-CM

## 2024-10-31 DIAGNOSIS — O99.210 OBESITY AFFECTING PREGNANCY, ANTEPARTUM, UNSPECIFIED OBESITY TYPE: ICD-10-CM

## 2024-10-31 LAB
EXTERNAL CHLAMYDIA SCREEN: NEGATIVE
EXTERNAL GONORRHEA SCREEN: NEGATIVE

## 2024-10-31 PROCEDURE — 90471 IMMUNIZATION ADMIN: CPT | Performed by: NURSE PRACTITIONER

## 2024-10-31 PROCEDURE — PNV: Performed by: NURSE PRACTITIONER

## 2024-10-31 PROCEDURE — 90656 IIV3 VACC NO PRSV 0.5 ML IM: CPT | Performed by: NURSE PRACTITIONER

## 2024-10-31 RX ORDER — ASPIRIN 81 MG/1
162 TABLET, CHEWABLE ORAL DAILY
Qty: 180 TABLET | Refills: 1 | Status: SHIPPED | OUTPATIENT
Start: 2024-10-31

## 2024-10-31 RX ORDER — NYSTATIN 100000 [USP'U]/G
POWDER TOPICAL 3 TIMES DAILY
Qty: 60 G | Refills: 1 | Status: SHIPPED | OUTPATIENT
Start: 2024-10-31

## 2024-10-31 NOTE — ASSESSMENT & PLAN NOTE
Pt reports chronic yeast under breasts and under abdominal fold, noted on exam today, Rx Nystatin powder

## 2024-10-31 NOTE — PROGRESS NOTES
Routine Prenatal Visit  [facility] - [place] [id number] [place] [address]    Assessment/Plan:  Fuad is a 39 y.o. year old  at 11w6d who presents for routine prenatal visit.     1. Need for influenza vaccination  -     influenza vaccine preservative-free 0.5 mL IM (Fluzone, Afluria, Fluarix, Flulaval)  2. Antepartum multigravida of advanced maternal age  Assessment & Plan:  Recommend low dose  mg PO Daily 12-36 weeks for AMA, BMI for preeclampsia prevention  Orders:  -     aspirin 81 mg chewable tablet; Chew 2 tablets (162 mg total) daily  3. Obesity affecting pregnancy, antepartum, unspecified obesity type  Assessment & Plan:  Pregravid BMI 39  Early and traditional 1 hour gtt  Third trimester growth  Weekly APFS @ 34 weeks  Orders:  -     aspirin 81 mg chewable tablet; Chew 2 tablets (162 mg total) daily  4. Depression affecting pregnancy, antepartum  Assessment & Plan:  Tried stopping Lexapro but restarted  Currently taking Lexapro 10 mg daily, feeling better  5. 11 weeks gestation of pregnancy  -     IGP,CtNg,AptimaHPV,rfx16/18,45  6. Skin yeast infection  Assessment & Plan:  Pt reports chronic yeast under breasts and under abdominal fold, noted on exam today, Rx Nystatin powder   Orders:  -     nystatin (MYCOSTATIN) powder; Apply topically 3 (three) times a day      Next OB Visit 4 weeks.    Subjective:     CC: Prenatal care    Fuad Dudley is a 39 y.o.  female who presents for routine prenatal care at 11w6d.  Pregnancy ROS: no leakage of fluid, pelvic pain, or vaginal bleeding.  no fetal movement yet.    The following portions of the patient's history were reviewed and updated as appropriate: allergies, current medications, past family history, past medical history, obstetric history, gynecologic history, past social history, past surgical history and problem list.      Objective:  /60   Wt 109 kg (240 lb 12.8 oz)   LMP 2024 (Approximate)   BMI 38.87 kg/m²    Pregravid Weight/BMI: 112 kg (247 lb) (BMI 39.89)  Current Weight: 109 kg (240 lb 12.8 oz)   Total Weight Gain: -2.812 kg (-6 lb 3.2 oz)   Pre- Vitals      Flowsheet Row Most Recent Value   Prenatal Assessment    Fetal Heart Rate 161   Movement Absent   Prenatal Vitals    Blood Pressure 122/60   Weight - Scale 109 kg (240 lb 12.8 oz)   Urine Albumin/Glucose    Dilation/Effacement/Station    Cervical Dilation 0   Cervical Effacement 0   Vaginal Drainage    Draining Fluid No   Edema    LLE Edema None   RLE Edema None           Prenatal physical complete  General: Well appearing, no distress  Abdomen: Soft, gravid, nontender  Extremities: Non tender.

## 2024-10-31 NOTE — ASSESSMENT & PLAN NOTE
Pregravid BMI 39  Early and traditional 1 hour gtt  Third trimester growth  Weekly APFS @ 34 weeks

## 2024-11-09 LAB
C TRACH RRNA CVX QL NAA+PROBE: NEGATIVE
CYTOLOGIST CVX/VAG CYTO: NORMAL
DX ICD CODE: NORMAL
HPV GENOTYPE REFLEX: NORMAL
HPV I/H RISK 4 DNA CVX QL PROBE+SIG AMP: NEGATIVE
N GONORRHOEA RRNA CVX QL NAA+PROBE: NEGATIVE
OTHER STN SPEC: NORMAL
PATH REPORT.FINAL DX SPEC: NORMAL
SL AMB CLINICAL HISTORY: NORMAL
SL AMB NOTE:: NORMAL
SL AMB SPECIMEN ADEQUACY: NORMAL
SL AMB TEST METHODOLOGY: NORMAL

## 2024-11-12 ENCOUNTER — TELEPHONE (OUTPATIENT)
Age: 39
End: 2024-11-12

## 2024-11-25 ENCOUNTER — ROUTINE PRENATAL (OUTPATIENT)
Dept: OBGYN CLINIC | Facility: CLINIC | Age: 39
End: 2024-11-25
Payer: COMMERCIAL

## 2024-11-25 VITALS — DIASTOLIC BLOOD PRESSURE: 60 MMHG | SYSTOLIC BLOOD PRESSURE: 120 MMHG | WEIGHT: 236.2 LBS | BODY MASS INDEX: 38.12 KG/M2

## 2024-11-25 DIAGNOSIS — O99.340 DEPRESSION AFFECTING PREGNANCY, ANTEPARTUM: ICD-10-CM

## 2024-11-25 DIAGNOSIS — Z3A.15 15 WEEKS GESTATION OF PREGNANCY: ICD-10-CM

## 2024-11-25 DIAGNOSIS — Z36.1 NEED FOR MATERNAL SERUM ALPHA-PROTEIN (MSAFP) SCREENING: ICD-10-CM

## 2024-11-25 DIAGNOSIS — O09.522 MULTIGRAVIDA OF ADVANCED MATERNAL AGE IN SECOND TRIMESTER: Primary | ICD-10-CM

## 2024-11-25 DIAGNOSIS — F32.A DEPRESSION AFFECTING PREGNANCY, ANTEPARTUM: ICD-10-CM

## 2024-11-25 LAB
SL AMB  POCT GLUCOSE, UA: NEGATIVE
SL AMB POCT URINE PROTEIN: NEGATIVE

## 2024-11-25 PROCEDURE — PNV: Performed by: OBSTETRICS & GYNECOLOGY

## 2024-11-25 PROCEDURE — 81002 URINALYSIS NONAUTO W/O SCOPE: CPT | Performed by: OBSTETRICS & GYNECOLOGY

## 2024-11-25 NOTE — PROGRESS NOTES
Routine Prenatal Visit  [facility] - [place]  [address] [place] [address]    Assessment/Plan:  Fuad is a 39 y.o. year old  at 15w3d who presents for routine prenatal visit.     1. Multigravida of advanced maternal age in second trimester  2. Depression affecting pregnancy, antepartum  3. 15 weeks gestation of pregnancy  -     POCT urine dip        Subjective:     CC: Prenatal care    Fuad Dudley is a 39 y.o.  female who presents for routine prenatal care at 15w3d.  Pregnancy ROS: no leakage of fluid, pelvic pain, or vaginal bleeding.  no fetal movement.    The following portions of the patient's history were reviewed and updated as appropriate: allergies, current medications, past family history, past medical history, obstetric history, gynecologic history, past social history, past surgical history and problem list.      Objective:  /60   Wt 107 kg (236 lb 3.2 oz)   LMP 2024 (Approximate)   BMI 38.12 kg/m²   Pregravid Weight/BMI: 112 kg (247 lb) (BMI 39.89)  Current Weight: 107 kg (236 lb 3.2 oz)   Total Weight Gain: -4.899 kg (-10 lb 12.8 oz)   Pre- Vitals      Flowsheet Row Most Recent Value   Prenatal Assessment    Fetal Heart Rate 150   Fundal Height (cm) 16 cm   Movement Absent   Prenatal Vitals    Blood Pressure 120/60   Weight - Scale 107 kg (236 lb 3.2 oz)   Urine Albumin/Glucose    Dilation/Effacement/Station    Vaginal Drainage    Edema              General: Well appearing, no distress  Respiratory: Unlabored breathing  Cardiovascular: Regular rate.  Abdomen: Soft, gravid, nontender  Fundal Height: Appropriate for gestational age.  Extremities: Warm and well perfused.  Non tender.

## 2024-12-05 ENCOUNTER — CLINICAL SUPPORT (OUTPATIENT)
Facility: HOSPITAL | Age: 39
End: 2024-12-05

## 2024-12-05 DIAGNOSIS — Z3A.16 16 WEEKS GESTATION OF PREGNANCY: Primary | ICD-10-CM

## 2024-12-05 PROCEDURE — NC001 PR NO CHARGE

## 2024-12-05 NOTE — PROGRESS NOTES
Patient attended the Genetic Screening Education Session via [employer].    The group reviewed basic chromosome information and the increasing risk for aneuploidy based on patient age.  Copy Number variants (microdeletions/duplications) were also reviewed with a general population risk of 0.4% in any pregnancy.    The group discussed the options for prenatal screening and diagnosis for chromosomal abnormalities.     The benefits and limitations of fetal anatomy ultrasound at 20 weeks gestation were reviewed.    Quad screening consists of second trimester biochemical analysis. Results provide a numerical risk for Down syndrome, Trisomy 18, and open neural tube defects.  Group attendees were instructed to contact their OB office if they desired Quad screen.    Cell-free fetal DNA (NIPT) screening analyzes placental DNA in maternal serum and can assess the risk for Down syndrome, trisomy 18, trisomy 13, and sex chromosome anomalies. Results report as screen negative or screen positive, and fetal sex information is provided.  The possibility of no-result and increased risk result was addressed. Maternal serum AFP screening is recommended at 16-18 weeks to screen for open neural tube defects.    The benefits and limitations of these screens, including detection rates and false positive rates, were reviewed.    Prenatal diagnostic testing is available via amniocentesis. The timing, risks (including their associated risks of miscarriage) and benefits were reviewed.     Lastly, we reviewed that all pregnancies have a 3-6% risk of birth defect, genetic condition, or intellectual disability regardless of age or personal/family history. There is no available testing to rule out all conditions.     We reviewed potential costs associated with cell-free fetal DNA (NIPT) screen and provided resources, including information to check out of pocket cost with their insurance.  The self pay price of $299 was also discussed.  Group  attendees were instructed to contact [facility] or Genetic Counseling (phone number provided) if they were interested in pursuing NIPT.  Nurse visit for NIPT can be scheduled by [facility].

## 2024-12-06 LAB
EXTERNAL ABO GROUPING: NORMAL
EXTERNAL ANTIBODY SCREEN: NORMAL
EXTERNAL HEMATOCRIT: 35.4 %
EXTERNAL HEMOGLOBIN: 11.7 G/DL
EXTERNAL HEPATITIS B SURFACE ANTIGEN: NEGATIVE
EXTERNAL HIV-1/2 AB-AG: NORMAL
EXTERNAL PLATELET COUNT: 204 K/ÂΜL
EXTERNAL RH FACTOR: POSITIVE
EXTERNAL RUBELLA IGG QUANTITATION: NORMAL
EXTERNAL SYPHILIS TOTAL IGG/IGM SCREENING: NONREACTIVE

## 2024-12-09 ENCOUNTER — RESULTS FOLLOW-UP (OUTPATIENT)
Dept: OBGYN CLINIC | Facility: CLINIC | Age: 39
End: 2024-12-09

## 2024-12-09 DIAGNOSIS — O99.810 ABNORMAL GLUCOSE AFFECTING PREGNANCY: Primary | ICD-10-CM

## 2024-12-09 LAB
ABO GROUP BLD: ABNORMAL
APPEARANCE UR: ABNORMAL
BACTERIA UR CULT: ABNORMAL
BACTERIA UR CULT: NO GROWTH
BACTERIA URNS QL MICRO: ABNORMAL
BASOPHILS # BLD AUTO: 0 X10E3/UL (ref 0–0.2)
BASOPHILS NFR BLD AUTO: 1 %
BILIRUB UR QL STRIP: NEGATIVE
BLD GP AB SCN SERPL QL: NEGATIVE
C TRACH RRNA SPEC QL NAA+PROBE: NEGATIVE
CASTS URNS QL MICRO: ABNORMAL /LPF
COLOR UR: YELLOW
EOSINOPHIL # BLD AUTO: 0 X10E3/UL (ref 0–0.4)
EOSINOPHIL NFR BLD AUTO: 1 %
EPI CELLS #/AREA URNS HPF: ABNORMAL /HPF (ref 0–10)
ERYTHROCYTE [DISTWIDTH] IN BLOOD BY AUTOMATED COUNT: 15.4 % (ref 11.7–15.4)
GLUCOSE 1H P 50 G GLC PO SERPL-MCNC: 161 MG/DL (ref 70–139)
GLUCOSE UR QL: NEGATIVE
HBV SURFACE AG SERPL QL IA: NEGATIVE
HCT VFR BLD AUTO: 35.4 % (ref 34–46.6)
HCV AB S/CO SERPL IA: NON REACTIVE
HGB BLD-MCNC: 11.7 G/DL (ref 11.1–15.9)
HGB UR QL STRIP: NEGATIVE
HIV 1+2 AB+HIV1 P24 AG SERPL QL IA: NON REACTIVE
IMM GRANULOCYTES # BLD: 0 X10E3/UL (ref 0–0.1)
IMM GRANULOCYTES NFR BLD: 0 %
KETONES UR QL STRIP: ABNORMAL
LEUKOCYTE ESTERASE UR QL STRIP: ABNORMAL
LYMPHOCYTES # BLD AUTO: 1.8 X10E3/UL (ref 0.7–3.1)
LYMPHOCYTES NFR BLD AUTO: 29 %
MCH RBC QN AUTO: 28.8 PG (ref 26.6–33)
MCHC RBC AUTO-ENTMCNC: 33.1 G/DL (ref 31.5–35.7)
MCV RBC AUTO: 87 FL (ref 79–97)
MICRO URNS: ABNORMAL
MONOCYTES # BLD AUTO: 0.4 X10E3/UL (ref 0.1–0.9)
MONOCYTES NFR BLD AUTO: 6 %
N GONORRHOEA RRNA SPEC QL NAA+PROBE: NEGATIVE
NEUTROPHILS # BLD AUTO: 3.8 X10E3/UL (ref 1.4–7)
NEUTROPHILS NFR BLD AUTO: 63 %
NITRITE UR QL STRIP: NEGATIVE
PH UR STRIP: 5.5 [PH] (ref 5–7.5)
PLATELET # BLD AUTO: 204 X10E3/UL (ref 150–450)
PROT UR QL STRIP: ABNORMAL
RBC # BLD AUTO: 4.06 X10E6/UL (ref 3.77–5.28)
RBC #/AREA URNS HPF: ABNORMAL /HPF (ref 0–2)
RH BLD: POSITIVE
RPR SER QL: NON REACTIVE
RUBV IGG SERPL IA-ACNC: 1.38 INDEX
SL AMB INTERPRETATION: NORMAL
SP GR UR: 1.02 (ref 1–1.03)
UROBILINOGEN UR STRIP-ACNC: 0.2 MG/DL (ref 0.2–1)
WBC # BLD AUTO: 6.1 X10E3/UL (ref 3.4–10.8)
WBC #/AREA URNS HPF: ABNORMAL /HPF (ref 0–5)

## 2024-12-10 NOTE — TELEPHONE ENCOUNTER
Called patient at number listed on chart to review lab results, no voicemail available. Will send "[employer]t message.

## 2024-12-26 ENCOUNTER — ROUTINE PRENATAL (OUTPATIENT)
Dept: OBGYN CLINIC | Facility: CLINIC | Age: 39
End: 2024-12-26
Payer: COMMERCIAL

## 2024-12-26 VITALS
HEIGHT: 66 IN | BODY MASS INDEX: 38.09 KG/M2 | SYSTOLIC BLOOD PRESSURE: 120 MMHG | DIASTOLIC BLOOD PRESSURE: 58 MMHG | WEIGHT: 237 LBS

## 2024-12-26 DIAGNOSIS — Z3A.19 19 WEEKS GESTATION OF PREGNANCY: ICD-10-CM

## 2024-12-26 DIAGNOSIS — O99.810 ABNORMAL GLUCOSE AFFECTING PREGNANCY: Primary | ICD-10-CM

## 2024-12-26 DIAGNOSIS — O09.522 MULTIGRAVIDA OF ADVANCED MATERNAL AGE IN SECOND TRIMESTER: ICD-10-CM

## 2024-12-26 LAB
SL AMB  POCT GLUCOSE, UA: NORMAL
SL AMB POCT URINE PROTEIN: NORMAL

## 2024-12-26 PROCEDURE — 81002 URINALYSIS NONAUTO W/O SCOPE: CPT | Performed by: PHYSICIAN ASSISTANT

## 2024-12-26 PROCEDURE — PNV: Performed by: PHYSICIAN ASSISTANT

## 2024-12-26 NOTE — ASSESSMENT & PLAN NOTE
Reviewed recommendation for 3 hr GTT.   Patient requests alternative. Reviewed option of SMBG x 1 week 4x's daily fasting and 1 or 2 hours after each bite of meal.  has glucometer, would like to proceed with the SMBG. Will reach out if needs lancets or test strips. Will send values via [employer] after 1 week. Reviewed will also need to do again at 28 weeks if normal.

## 2024-12-26 NOTE — ASSESSMENT & PLAN NOTE
Reviewed recommended not to push/pull/or lift more than 30lbs in pregnancy. Offered pelvic floor physical therapy. Declines at this time. Reports resolves when not lifting, likely round ligament discomfort.   Has Level II ultrasound scheduled.   Return to office for ob check in 4 weeks.

## 2024-12-26 NOTE — PROGRESS NOTES
"Routine Prenatal Visit  [facility] - [address], [address], [place] [address]    Assessment/Plan:  Fuad is a 39 y.o. year old  at 19w6d who presents for routine prenatal visit.     1. Abnormal glucose affecting pregnancy  Assessment & Plan:  Reviewed recommendation for 3 hr GTT.   Patient requests alternative. Reviewed option of SMBG x 1 week 4x's daily fasting and 1 or 2 hours after each bite of meal.  has glucometer, would like to proceed with the SMBG. Will reach out if needs lancets or test strips. Will send values via [employer] after 1 week. Reviewed will also need to do again at 28 weeks if normal.     2. 19 weeks gestation of pregnancy  Assessment & Plan:  Reviewed recommended not to push/pull/or lift more than 30lbs in pregnancy. Offered pelvic floor physical therapy. Declines at this time. Reports resolves when not lifting, likely round ligament discomfort.   Has Level II ultrasound scheduled.   Return to office for ob check in 4 weeks.   Orders:  -     POCT urine dip  3. Multigravida of advanced maternal age in second trimester      Next OB Visit 4 weeks.    Subjective:     CC: Prenatal care    Fuad Dudley is a 39 y.o.  female who presents for routine prenatal care at 19w6d.  Pregnancy ROS: Good FM, occasional pain worse with lifting pulling pain, resolves on own. Patient had a very physical job. Consistently lifting 25 lbs repetitively throughout the day. No N/V, HA, cramping, VB, LOF, edema, domestic violence, or smoking. Tolerating PNV.        The following portions of the patient's history were reviewed and updated as appropriate: allergies, current medications, past family history, past medical history, obstetric history, gynecologic history, past social history, past surgical history and problem list.      Objective:  /58 (BP Location: Left arm, Patient Position: Sitting, Cuff Size: Large)   Ht 5' 6\" (1.676 m)   Wt 108 kg (237 lb)   LMP " 2024 (Approximate)   BMI 38.25 kg/m²   Pregravid Weight/BMI: 112 kg (247 lb) (BMI 39.89)  Current Weight: 108 kg (237 lb)   Total Weight Gain: -4.536 kg (-10 lb)   Pre- Vitals      Flowsheet Row Most Recent Value   Prenatal Assessment    Fetal Heart Rate 146   Fundal Height (cm) 20 cm   Movement Present   Prenatal Vitals    Blood Pressure 120/58   Weight - Scale 108 kg (237 lb)   Urine Albumin/Glucose    Dilation/Effacement/Station    Vaginal Drainage    Edema    LLE Edema None   RLE Edema None   Facial Edema None             General: Well appearing, no distress  Abdomen: Soft, gravid, nontender  Fundal Height: Appropriate for gestational age.  Extremities: Warm and well perfused.  Non tender.

## 2024-12-31 ENCOUNTER — TELEPHONE (OUTPATIENT)
Dept: OBGYN CLINIC | Facility: CLINIC | Age: 39
End: 2024-12-31

## 2024-12-31 NOTE — TELEPHONE ENCOUNTER
Second Trimester Calls:  Attempt to call patient, l/m on voicemail and [employer] message sent to patient.     Overall how are you doing?     Compliant with routine OB care appointments? Yes    Have you completed your 1st trimester labs? Yes    If you had NIPS with MFM, do you have a order for MSAFP? Ordered, not completed   Can be completed 15w-22w6d, ideally 16w-18w    Have you seen MFM and do you have your detailed US scheduled? 01/10/2025    Pregnancy Education-have you had a chance to review the classes offered and registered?

## 2025-01-03 LAB
GLUCOSE 1H P 100 G GLC PO SERPL-MCNC: 155 MG/DL (ref 70–179)
GLUCOSE 2H P 100 G GLC PO SERPL-MCNC: 126 MG/DL (ref 70–154)
GLUCOSE 3H P 100 G GLC PO SERPL-MCNC: 99 MG/DL (ref 70–139)
GLUCOSE P FAST SERPL-MCNC: 97 MG/DL (ref 70–94)
SL AMB NOTE:: ABNORMAL

## 2025-01-10 ENCOUNTER — ROUTINE PRENATAL (OUTPATIENT)
Dept: PERINATAL CARE | Facility: CLINIC | Age: 40
End: 2025-01-10
Payer: COMMERCIAL

## 2025-01-10 VITALS
SYSTOLIC BLOOD PRESSURE: 128 MMHG | HEART RATE: 97 BPM | HEIGHT: 66 IN | OXYGEN SATURATION: 99 % | DIASTOLIC BLOOD PRESSURE: 70 MMHG | BODY MASS INDEX: 37.86 KG/M2 | WEIGHT: 235.6 LBS

## 2025-01-10 DIAGNOSIS — Z36.3 ENCOUNTER FOR ANTENATAL SCREENING FOR MALFORMATION: ICD-10-CM

## 2025-01-10 DIAGNOSIS — O44.40 LOW-LYING PLACENTA: ICD-10-CM

## 2025-01-10 DIAGNOSIS — Z3A.22 22 WEEKS GESTATION OF PREGNANCY: Primary | ICD-10-CM

## 2025-01-10 DIAGNOSIS — O99.210 OBESITY AFFECTING PREGNANCY, ANTEPARTUM, UNSPECIFIED OBESITY TYPE: ICD-10-CM

## 2025-01-10 DIAGNOSIS — Z36.86 ENCOUNTER FOR ANTENATAL SCREENING FOR CERVICAL LENGTH: ICD-10-CM

## 2025-01-10 DIAGNOSIS — O09.522 MULTIGRAVIDA OF ADVANCED MATERNAL AGE IN SECOND TRIMESTER: ICD-10-CM

## 2025-01-10 DIAGNOSIS — N91.2 AMENORRHEA: ICD-10-CM

## 2025-01-10 PROCEDURE — 99204 OFFICE O/P NEW MOD 45 MIN: CPT | Performed by: STUDENT IN AN ORGANIZED HEALTH CARE EDUCATION/TRAINING PROGRAM

## 2025-01-10 PROCEDURE — 76817 TRANSVAGINAL US OBSTETRIC: CPT | Performed by: STUDENT IN AN ORGANIZED HEALTH CARE EDUCATION/TRAINING PROGRAM

## 2025-01-10 PROCEDURE — 76811 OB US DETAILED SNGL FETUS: CPT | Performed by: STUDENT IN AN ORGANIZED HEALTH CARE EDUCATION/TRAINING PROGRAM

## 2025-01-10 NOTE — LETTER
January 10, 2025     JOE Rondon  [address] [address]    Patient: Fuad Dudley   YOB: 1985   Date of Visit: 1/10/2025       Dear Dr. Vigil:    Thank you for referring Fuad Dudley to me for evaluation. Below are my notes for this consultation.    If you have questions, please do not hesitate to call me. I look forward to following your patient along with you.         Sincerely,        Cecy Lugo MD        CC: No Recipients    Cecy Lugo MD  1/10/2025  2:12 PM  Sign when Signing Visit  This patient received  care under my supervision on 01/10/25 at 22w0d gestational age at [facility].  The note is contained in the ultrasound report located under OB Procedures tab in Epic.  Please call our office at [phone] with questions.  -Cecy Lugo MD

## 2025-01-10 NOTE — PROGRESS NOTES
This patient received  care under my supervision on 01/10/25 at 22w0d gestational age at [facility].  The note is contained in the ultrasound report located under OB Procedures tab in Epic.  Please call our office at [phone] with questions.  -Cecy Lugo MD

## 2025-01-21 PROBLEM — Z3A.23 23 WEEKS GESTATION OF PREGNANCY: Status: ACTIVE | Noted: 2024-10-31

## 2025-01-22 ENCOUNTER — ROUTINE PRENATAL (OUTPATIENT)
Dept: OBGYN CLINIC | Facility: CLINIC | Age: 40
End: 2025-01-22
Payer: COMMERCIAL

## 2025-01-22 VITALS
DIASTOLIC BLOOD PRESSURE: 68 MMHG | HEIGHT: 66 IN | SYSTOLIC BLOOD PRESSURE: 124 MMHG | BODY MASS INDEX: 37.73 KG/M2 | WEIGHT: 234.8 LBS

## 2025-01-22 DIAGNOSIS — Z36.89 ENCOUNTER FOR OTHER SPECIFIED ANTENATAL SCREENING: ICD-10-CM

## 2025-01-22 DIAGNOSIS — F32.A DEPRESSION AFFECTING PREGNANCY, ANTEPARTUM: ICD-10-CM

## 2025-01-22 DIAGNOSIS — Z3A.23 23 WEEKS GESTATION OF PREGNANCY: ICD-10-CM

## 2025-01-22 DIAGNOSIS — O99.810 ABNORMAL GLUCOSE AFFECTING PREGNANCY: ICD-10-CM

## 2025-01-22 DIAGNOSIS — O44.40 LOW-LYING PLACENTA: ICD-10-CM

## 2025-01-22 DIAGNOSIS — O99.340 DEPRESSION AFFECTING PREGNANCY, ANTEPARTUM: ICD-10-CM

## 2025-01-22 DIAGNOSIS — O09.522 MULTIGRAVIDA OF ADVANCED MATERNAL AGE IN SECOND TRIMESTER: Primary | ICD-10-CM

## 2025-01-22 DIAGNOSIS — O99.210 OBESITY AFFECTING PREGNANCY, ANTEPARTUM, UNSPECIFIED OBESITY TYPE: ICD-10-CM

## 2025-01-22 LAB
SL AMB  POCT GLUCOSE, UA: NORMAL
SL AMB POCT URINE PROTEIN: NORMAL

## 2025-01-22 PROCEDURE — PNV: Performed by: STUDENT IN AN ORGANIZED HEALTH CARE EDUCATION/TRAINING PROGRAM

## 2025-01-22 PROCEDURE — 81002 URINALYSIS NONAUTO W/O SCOPE: CPT | Performed by: STUDENT IN AN ORGANIZED HEALTH CARE EDUCATION/TRAINING PROGRAM

## 2025-01-22 NOTE — ASSESSMENT & PLAN NOTE
- Denies vaginal bleeding   - S/p MFM consult   - Will plan for growth US at 32 weeks for growth

## 2025-01-22 NOTE — ASSESSMENT & PLAN NOTE
- Labs up to date   - Last US level II   - Vaccinations: declines flu shot   - RTC for 28 week visit     Orders:    POCT urine dip

## 2025-01-22 NOTE — PROGRESS NOTES
"Routine Prenatal Visit  [facility] - [place]  [address] [place] [address]  Assessment & Plan  Encounter for other specified  screening    Orders:    CBC; Future    RPR, Rfx Qn RPR/Confirm TP; Future    Glucose SARAH 3HR 100GM PREG PTS; Future    23 weeks gestation of pregnancy  - Labs up to date   - Last US level II   - Vaccinations: declines flu shot   - RTC for 28 week visit     Orders:    POCT urine dip    Depression affecting pregnancy, antepartum  - Reports mood is stable    - Continues on Lexapro        Obesity affecting pregnancy, antepartum, unspecified obesity type  - BMI 38   - Continues on ASA therapy until 36 weeks        Multigravida of advanced maternal age in second trimester  - S/p MFM consult        Abnormal glucose affecting pregnancy  - No GDM with early testing   - Plan for 3rd trimester screening        Low-lying placenta  - Denies vaginal bleeding   - S/p MFM consult   - Will plan for growth US at 32 weeks for growth       Subjective:   Fuad Dudley is a 39 y.o.  who presents for routine prenatal care at 23w4d.  Complaints today: none     LOF: no; VB: no; Contractions: no; FM: yes     Objective:  /68 (BP Location: Left arm, Patient Position: Sitting, Cuff Size: Standard)   Ht 5' 6\" (1.676 m)   Wt 107 kg (234 lb 12.8 oz)   LMP 2024 (Approximate)   BMI 37.90 kg/m²     General: Well appearing, no distress  Respiratory: Unlabored breathing  Cardiovascular: Regular rate.  Abdomen: Soft, gravid, nontender  Extremities: Warm and well perfused.  Non tender.    Pregravid Weight/BMI: 112 kg (247 lb) (BMI 39.89)  Current Weight: 107 kg (234 lb 12.8 oz)   Total Weight Gain: -5.534 kg (-12 lb 3.2 oz)     Pre-Argenis Vitals      Flowsheet Row Most Recent Value   Prenatal Assessment    Fetal Heart Rate 150   Fundal Height (cm) 25 cm   Prenatal Vitals    Blood Pressure 124/68   Weight - Scale 107 kg (234 lb 12.8 oz)   Urine Albumin/Glucose  "   Dilation/Effacement/Station    Vaginal Drainage    Edema              Richar House MD  1/22/2025 5:06 PM

## 2025-01-31 DIAGNOSIS — O09.529 ANTEPARTUM MULTIGRAVIDA OF ADVANCED MATERNAL AGE: ICD-10-CM

## 2025-01-31 DIAGNOSIS — O99.210 OBESITY AFFECTING PREGNANCY, ANTEPARTUM, UNSPECIFIED OBESITY TYPE: ICD-10-CM

## 2025-02-03 RX ORDER — ASPIRIN 81 MG
TABLET,CHEWABLE ORAL
Qty: 60 TABLET | Refills: 3 | Status: SHIPPED | OUTPATIENT
Start: 2025-02-03

## 2025-02-18 ENCOUNTER — ROUTINE PRENATAL (OUTPATIENT)
Dept: OBGYN CLINIC | Facility: CLINIC | Age: 40
End: 2025-02-18
Payer: COMMERCIAL

## 2025-02-18 VITALS
BODY MASS INDEX: 37.77 KG/M2 | DIASTOLIC BLOOD PRESSURE: 72 MMHG | SYSTOLIC BLOOD PRESSURE: 120 MMHG | HEIGHT: 66 IN | WEIGHT: 235 LBS

## 2025-02-18 DIAGNOSIS — O09.522 MULTIGRAVIDA OF ADVANCED MATERNAL AGE IN SECOND TRIMESTER: ICD-10-CM

## 2025-02-18 DIAGNOSIS — F32.A DEPRESSION AFFECTING PREGNANCY, ANTEPARTUM: ICD-10-CM

## 2025-02-18 DIAGNOSIS — Z3A.23 23 WEEKS GESTATION OF PREGNANCY: ICD-10-CM

## 2025-02-18 DIAGNOSIS — O99.340 DEPRESSION AFFECTING PREGNANCY, ANTEPARTUM: ICD-10-CM

## 2025-02-18 DIAGNOSIS — O44.40 LOW-LYING PLACENTA: ICD-10-CM

## 2025-02-18 DIAGNOSIS — Z3A.27 27 WEEKS GESTATION OF PREGNANCY: Primary | ICD-10-CM

## 2025-02-18 LAB
SL AMB  POCT GLUCOSE, UA: NORMAL
SL AMB POCT URINE PROTEIN: NORMAL

## 2025-02-18 PROCEDURE — PNV: Performed by: OBSTETRICS & GYNECOLOGY

## 2025-02-18 PROCEDURE — 81002 URINALYSIS NONAUTO W/O SCOPE: CPT | Performed by: OBSTETRICS & GYNECOLOGY

## 2025-02-18 NOTE — PROGRESS NOTES
"Routine Prenatal Visit  [facility] - [place]  [address] [place] [address]    Assessment/Plan:  Fuad is a 39 y.o. year old  at 27w4d who presents for routine prenatal visit.     1. 27 weeks gestation of pregnancy  Assessment & Plan:  Will do bloodwork this week. 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 Will send message to Martín about getting letter for patient.   Orders:  -     POCT urine dip  2. Multigravida of advanced maternal age in second trimester  3. 23 weeks gestation of pregnancy  Assessment & Plan:  Will do bloodwork this week. 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 Will send message to Martín about getting letter for patient.   4. Depression affecting pregnancy, antepartum  Assessment & Plan:  Doing well on lexapro  5. Low-lying placenta  Assessment & Plan:  Repeat growth at 32 weeks        Subjective:   Fuad Dudley is a 39 y.o.  who presents for routine prenatal care at 27w4d.  Complaints today: Denies  LOF: -; VB: -; Contractions: -; FM: +    Objective:  /72 (BP Location: Left arm, Patient Position: Sitting, Cuff Size: Large)   Ht 5' 6\" (1.676 m)   Wt 107 kg (235 lb)   LMP 2024 (Approximate)   BMI 37.93 kg/m²     General: Well appearing, no distress  Respiratory: Unlabored breathing  Abdomen: Soft, gravid, nontender  Extremities: Warm and well perfused.  Non tender.    Pregravid Weight/BMI: 112 kg (247 lb) (BMI 39.89)  Current Weight: 107 kg (235 lb)   Total Weight Gain: -5.443 kg (-12 lb)     Pre-Argenis Vitals      Flowsheet Row Most Recent Value   Prenatal Assessment    Fetal Heart Rate 135   Movement Present   Prenatal Vitals    Blood Pressure 120/72   Weight - Scale 107 kg (235 lb)   Urine Albumin/Glucose    Dilation/Effacement/Station    Vaginal Drainage    Edema              Ashish Don,   2025 3:55 PM     "

## 2025-02-18 NOTE — ASSESSMENT & PLAN NOTE
Will do bloodwork this week. 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 Will send message to Martín about getting letter for patient.

## 2025-02-19 ENCOUNTER — TELEPHONE (OUTPATIENT)
Dept: OBGYN CLINIC | Facility: CLINIC | Age: 40
End: 2025-02-19

## 2025-02-19 NOTE — TELEPHONE ENCOUNTER
Message left to patient to contact office to discuss Job Restrictions during pregnancy. Will she need a form completed or a letter.

## 2025-03-01 ENCOUNTER — RESULTS FOLLOW-UP (OUTPATIENT)
Dept: LABOR AND DELIVERY | Facility: HOSPITAL | Age: 40
End: 2025-03-01

## 2025-03-01 LAB
GLUCOSE 1H P 100 G GLC PO SERPL-MCNC: 142 MG/DL (ref 70–179)
GLUCOSE 2H P 100 G GLC PO SERPL-MCNC: 98 MG/DL (ref 70–154)
GLUCOSE 3H P 100 G GLC PO SERPL-MCNC: 64 MG/DL (ref 70–139)
GLUCOSE P FAST SERPL-MCNC: 88 MG/DL (ref 70–94)
SL AMB NOTE:: ABNORMAL

## 2025-03-04 ENCOUNTER — ROUTINE PRENATAL (OUTPATIENT)
Dept: OBGYN CLINIC | Facility: CLINIC | Age: 40
End: 2025-03-04
Payer: COMMERCIAL

## 2025-03-04 VITALS
SYSTOLIC BLOOD PRESSURE: 118 MMHG | DIASTOLIC BLOOD PRESSURE: 82 MMHG | BODY MASS INDEX: 37.86 KG/M2 | HEIGHT: 66 IN | WEIGHT: 235.6 LBS

## 2025-03-04 DIAGNOSIS — O09.522 MULTIGRAVIDA OF ADVANCED MATERNAL AGE IN SECOND TRIMESTER: ICD-10-CM

## 2025-03-04 DIAGNOSIS — O99.340 DEPRESSION AFFECTING PREGNANCY, ANTEPARTUM: ICD-10-CM

## 2025-03-04 DIAGNOSIS — O44.40 LOW-LYING PLACENTA: ICD-10-CM

## 2025-03-04 DIAGNOSIS — Z3A.29 29 WEEKS GESTATION OF PREGNANCY: Primary | ICD-10-CM

## 2025-03-04 DIAGNOSIS — F32.A DEPRESSION AFFECTING PREGNANCY, ANTEPARTUM: ICD-10-CM

## 2025-03-04 DIAGNOSIS — O99.810 ABNORMAL GLUCOSE AFFECTING PREGNANCY: ICD-10-CM

## 2025-03-04 DIAGNOSIS — O99.210 OBESITY AFFECTING PREGNANCY, ANTEPARTUM, UNSPECIFIED OBESITY TYPE: ICD-10-CM

## 2025-03-04 LAB
SL AMB  POCT GLUCOSE, UA: NORMAL
SL AMB POCT URINE PROTEIN: NORMAL

## 2025-03-04 PROCEDURE — 81002 URINALYSIS NONAUTO W/O SCOPE: CPT | Performed by: NURSE PRACTITIONER

## 2025-03-04 PROCEDURE — PNV: Performed by: NURSE PRACTITIONER

## 2025-03-04 NOTE — PROGRESS NOTES
"Routine Prenatal Visit  [facility] - [place]  [address], [place] [address]    Assessment/Plan:  Fuad is a 39 y.o. year old  at 29w4d who presents for routine prenatal visit.     1. 29 weeks gestation of pregnancy  Assessment & Plan:  Reviewed F[facility], S/S PTL  CBC and not completed, pt to return to the lab  Orders:  -     POCT urine dip  2. Abnormal glucose affecting pregnancy  Assessment & Plan:  2025 Normal 3 hour gtt  3. Obesity affecting pregnancy, antepartum, unspecified obesity type  Assessment & Plan:  Growth @ 32 weeks  4. Low-lying placenta  Assessment & Plan:  Reevaluate @ 32 week ultrasound  5. Multigravida of advanced maternal age in second trimester  Assessment & Plan:  Continue LDASA therapy until 36 weeks    6. Depression affecting pregnancy, antepartum  Assessment & Plan:  Stable on Lexapro      Next OB Visit 2 weeks.    Subjective:     CC: Prenatal care    Fuad Dudley is a 39 y.o.  female who presents for routine prenatal care at 29w4d. Pt feels well and has no complaints.   Pregnancy ROS: no leakage of fluid, pelvic pain, or vaginal bleeding.  normal fetal movement.    The following portions of the patient's history were reviewed and updated as appropriate: allergies, current medications, past family history, past medical history, obstetric history, gynecologic history, past social history, past surgical history and problem list.      Objective:  /82 (BP Location: Left arm, Patient Position: Sitting, Cuff Size: Standard)   Ht 5' 6\" (1.676 m)   Wt 107 kg (235 lb 9.6 oz)   LMP 2024 (Approximate)   BMI 38.03 kg/m²   Pregravid Weight/BMI: 112 kg (247 lb) (BMI 39.89)  Current Weight: 107 kg (235 lb 9.6 oz)   Total Weight Gain: -5.171 kg (-11 lb 6.4 oz)   Pre- Vitals      Flowsheet Row Most Recent Value   Prenatal Assessment    Fetal Heart Rate 136   Fundal Height (cm) 30 cm   Movement Present   Prenatal Vitals    Blood Pressure 118/82   Weight - " Scale 107 kg (235 lb 9.6 oz)   Urine Albumin/Glucose    Dilation/Effacement/Station    Vaginal Drainage    Draining Fluid No   Edema    LLE Edema None   RLE Edema None             General: Well appearing, no distress  Abdomen: Soft, gravid, nontender  Extremities: Non tender.

## 2025-03-06 LAB
DME PARACHUTE DELIVERY DATE REQUESTED: NORMAL
DME PARACHUTE ITEM DESCRIPTION: NORMAL
DME PARACHUTE ORDER STATUS: NORMAL
DME PARACHUTE SUPPLIER NAME: NORMAL
DME PARACHUTE SUPPLIER PHONE: NORMAL

## 2025-03-12 ENCOUNTER — TELEPHONE (OUTPATIENT)
Dept: OBGYN CLINIC | Facility: CLINIC | Age: 40
End: 2025-03-12

## 2025-03-12 NOTE — TELEPHONE ENCOUNTER
Third Trimester call placed -Voicemail received, left a message for patient to call back and sent message through [employer].     Overall how are you feeling?     Compliant with routine OB appointments? Pt was seen on 3/4/25-Provider informed patient her CBC and RPR was not completed, pt was advised to return to the lab. Left a friendly reminder to please complete blood work if not done.     Have you completed your 3rd trimester lab work?   No needs to complete     Have you reviewed the contents of 3rd trimester folder from office?    Have you decided on a pediatrician?     If yes, who Fifth pregnancy     If no, reviewed practices and transferred call to [phone] to set up appointment with pediatric office.   Questions on paperwork to go back to office?    Questions on the baby birth certificate and photography forms?       Sent link for the Hospital Readiness Video via [employer]

## 2025-03-15 LAB
DME PARACHUTE DELIVERY DATE ACTUAL: NORMAL
DME PARACHUTE DELIVERY DATE REQUESTED: NORMAL
DME PARACHUTE ITEM DESCRIPTION: NORMAL
DME PARACHUTE ORDER STATUS: NORMAL
DME PARACHUTE SUPPLIER NAME: NORMAL
DME PARACHUTE SUPPLIER PHONE: NORMAL
ERYTHROCYTE [DISTWIDTH] IN BLOOD BY AUTOMATED COUNT: 14.1 % (ref 11.7–15.4)
HCT VFR BLD AUTO: 34.8 % (ref 34–46.6)
HGB BLD-MCNC: 11.1 G/DL (ref 11.1–15.9)
MCH RBC QN AUTO: 27.5 PG (ref 26.6–33)
MCHC RBC AUTO-ENTMCNC: 31.9 G/DL (ref 31.5–35.7)
MCV RBC AUTO: 86 FL (ref 79–97)
PLATELET # BLD AUTO: 228 X10E3/UL (ref 150–450)
RBC # BLD AUTO: 4.03 X10E6/UL (ref 3.77–5.28)
RPR SER QL: NON REACTIVE
WBC # BLD AUTO: 6.8 X10E3/UL (ref 3.4–10.8)

## 2025-03-20 ENCOUNTER — ROUTINE PRENATAL (OUTPATIENT)
Dept: OBGYN CLINIC | Facility: CLINIC | Age: 40
End: 2025-03-20
Payer: COMMERCIAL

## 2025-03-20 VITALS — WEIGHT: 232.2 LBS | BODY MASS INDEX: 37.48 KG/M2 | SYSTOLIC BLOOD PRESSURE: 110 MMHG | DIASTOLIC BLOOD PRESSURE: 60 MMHG

## 2025-03-20 DIAGNOSIS — Z3A.31 31 WEEKS GESTATION OF PREGNANCY: ICD-10-CM

## 2025-03-20 DIAGNOSIS — O99.340 DEPRESSION AFFECTING PREGNANCY, ANTEPARTUM: ICD-10-CM

## 2025-03-20 DIAGNOSIS — O44.40 LOW-LYING PLACENTA: ICD-10-CM

## 2025-03-20 DIAGNOSIS — F32.A DEPRESSION AFFECTING PREGNANCY, ANTEPARTUM: ICD-10-CM

## 2025-03-20 DIAGNOSIS — O09.523 MULTIGRAVIDA OF ADVANCED MATERNAL AGE IN THIRD TRIMESTER: ICD-10-CM

## 2025-03-20 DIAGNOSIS — O99.210 OBESITY AFFECTING PREGNANCY, ANTEPARTUM, UNSPECIFIED OBESITY TYPE: Primary | ICD-10-CM

## 2025-03-20 LAB
SL AMB  POCT GLUCOSE, UA: NEGATIVE
SL AMB POCT URINE PROTEIN: NEGATIVE

## 2025-03-20 PROCEDURE — PNV: Performed by: OBSTETRICS & GYNECOLOGY

## 2025-03-20 PROCEDURE — 81002 URINALYSIS NONAUTO W/O SCOPE: CPT | Performed by: OBSTETRICS & GYNECOLOGY

## 2025-03-20 NOTE — PROGRESS NOTES
Routine Prenatal Visit  [facility] - [place] [id number] [place] [address]    Assessment/Plan:  Fuad is a 39 y.o. year old  at 31w6d who presents for routine prenatal visit.     1. Obesity affecting pregnancy, antepartum, unspecified obesity type  2. Multigravida of advanced maternal age in third trimester  3. Depression affecting pregnancy, antepartum  4. Low-lying placenta  5. 31 weeks gestation of pregnancy  -     POCT urine dip        Subjective:     CC: Prenatal care    Fuad Dudley is a 39 y.o.  female who presents for routine prenatal care at 31w6d.  Pregnancy ROS: no leakage of fluid, pelvic pain, or vaginal bleeding.  normal fetal movement.    The following portions of the patient's history were reviewed and updated as appropriate: allergies, current medications, past family history, past medical history, obstetric history, gynecologic history, past social history, past surgical history and problem list.      Objective:  /60   Wt 105 kg (232 lb 3.2 oz)   LMP 2024 (Approximate)   BMI 37.48 kg/m²   Pregravid Weight/BMI: 112 kg (247 lb) (BMI 39.89)  Current Weight: 105 kg (232 lb 3.2 oz)   Total Weight Gain: -6.713 kg (-14 lb 12.8 oz)   Pre- Vitals      Flowsheet Row Most Recent Value   Prenatal Assessment    Fetal Heart Rate 140   Fundal Height (cm) 32 cm   Movement Present   Presentation Vertex   Prenatal Vitals    Blood Pressure 110/60   Weight - Scale 105 kg (232 lb 3.2 oz)   Urine Albumin/Glucose    Dilation/Effacement/Station    Vaginal Drainage    Edema              General: Well appearing, no distress  Respiratory: Unlabored breathing  Cardiovascular: Regular rate.  Abdomen: Soft, gravid, nontender  Fundal Height: Appropriate for gestational age.  Extremities: Warm and well perfused.  Non tender.

## 2025-03-21 ENCOUNTER — ROUTINE PRENATAL (OUTPATIENT)
Dept: PERINATAL CARE | Facility: CLINIC | Age: 40
End: 2025-03-21
Payer: COMMERCIAL

## 2025-03-21 VITALS
OXYGEN SATURATION: 98 % | HEART RATE: 74 BPM | SYSTOLIC BLOOD PRESSURE: 110 MMHG | DIASTOLIC BLOOD PRESSURE: 64 MMHG | WEIGHT: 230.2 LBS | BODY MASS INDEX: 37 KG/M2 | HEIGHT: 66 IN

## 2025-03-21 DIAGNOSIS — O99.213 OBESITY AFFECTING PREGNANCY IN THIRD TRIMESTER, UNSPECIFIED OBESITY TYPE: ICD-10-CM

## 2025-03-21 DIAGNOSIS — Z36.89 ENCOUNTER FOR ULTRASOUND TO CHECK FETAL GROWTH: ICD-10-CM

## 2025-03-21 DIAGNOSIS — Z3A.32 32 WEEKS GESTATION OF PREGNANCY: Primary | ICD-10-CM

## 2025-03-21 DIAGNOSIS — O09.523 MULTIGRAVIDA OF ADVANCED MATERNAL AGE IN THIRD TRIMESTER: ICD-10-CM

## 2025-03-21 DIAGNOSIS — O44.40 LOW-LYING PLACENTA: ICD-10-CM

## 2025-03-21 PROCEDURE — 76817 TRANSVAGINAL US OBSTETRIC: CPT | Performed by: STUDENT IN AN ORGANIZED HEALTH CARE EDUCATION/TRAINING PROGRAM

## 2025-03-21 PROCEDURE — 99212 OFFICE O/P EST SF 10 MIN: CPT | Performed by: STUDENT IN AN ORGANIZED HEALTH CARE EDUCATION/TRAINING PROGRAM

## 2025-03-21 PROCEDURE — 76816 OB US FOLLOW-UP PER FETUS: CPT | Performed by: STUDENT IN AN ORGANIZED HEALTH CARE EDUCATION/TRAINING PROGRAM

## 2025-03-21 NOTE — PROGRESS NOTES
"[facility]: Ms. Dudley was seen today for followup placental location ultrasound with fetal growth measurement.  See ultrasound report under \"OB Procedures\" tab.      MDM:   I. Diagnoses/Problems addressed:  Low  II.  Data: Minimal  III.  Risk of morbidity: Minimal    Please don't hesitate to contact our office with any concerns or questions.  -Cecy Lugo MD  "

## 2025-03-22 PROBLEM — O09.523 MULTIGRAVIDA OF ADVANCED MATERNAL AGE IN THIRD TRIMESTER: Status: ACTIVE | Noted: 2024-10-31

## 2025-03-22 PROBLEM — Z3A.31 31 WEEKS GESTATION OF PREGNANCY: Status: ACTIVE | Noted: 2024-10-31

## 2025-04-03 ENCOUNTER — ROUTINE PRENATAL (OUTPATIENT)
Dept: OBGYN CLINIC | Facility: CLINIC | Age: 40
End: 2025-04-03
Payer: COMMERCIAL

## 2025-04-03 ENCOUNTER — TELEPHONE (OUTPATIENT)
Age: 40
End: 2025-04-03

## 2025-04-03 VITALS
BODY MASS INDEX: 37.45 KG/M2 | HEIGHT: 66 IN | SYSTOLIC BLOOD PRESSURE: 120 MMHG | DIASTOLIC BLOOD PRESSURE: 72 MMHG | WEIGHT: 233 LBS

## 2025-04-03 DIAGNOSIS — F32.A DEPRESSION AFFECTING PREGNANCY, ANTEPARTUM: ICD-10-CM

## 2025-04-03 DIAGNOSIS — O99.340 DEPRESSION AFFECTING PREGNANCY, ANTEPARTUM: ICD-10-CM

## 2025-04-03 DIAGNOSIS — O99.210 OBESITY AFFECTING PREGNANCY, ANTEPARTUM, UNSPECIFIED OBESITY TYPE: Primary | ICD-10-CM

## 2025-04-03 DIAGNOSIS — Z3A.33 33 WEEKS GESTATION OF PREGNANCY: ICD-10-CM

## 2025-04-03 DIAGNOSIS — O09.523 MULTIGRAVIDA OF ADVANCED MATERNAL AGE IN THIRD TRIMESTER: ICD-10-CM

## 2025-04-03 LAB
SL AMB  POCT GLUCOSE, UA: NORMAL
SL AMB POCT URINE PROTEIN: NORMAL

## 2025-04-03 PROCEDURE — PNV: Performed by: OBSTETRICS & GYNECOLOGY

## 2025-04-03 PROCEDURE — 81002 URINALYSIS NONAUTO W/O SCOPE: CPT | Performed by: OBSTETRICS & GYNECOLOGY

## 2025-04-03 NOTE — PROGRESS NOTES
"Routine Prenatal Visit  [facility] - [place]  [address] [place] [address]    Assessment/Plan:  Fuad is a 39 y.o. year old  at 33w6d who presents for routine prenatal visit.     1. Obesity affecting pregnancy, antepartum, unspecified obesity type  Assessment & Plan:  Has one more ultrasound scheduled, low lying placenta resolved. Start  testing @37wks  2. Multigravida of advanced maternal age in third trimester  3. Depression affecting pregnancy, antepartum  Assessment & Plan:  Doing well on lexapro, no concerns  4. 33 weeks gestation of pregnancy  Assessment & Plan:  Low lying placenta resolved  Orders:  -     POCT urine dip        Subjective:   Fuad Dudley is a 39 y.o.  who presents for routine prenatal care at 33w6d.  Complaints today: Denies  LOF: -; VB: -; Contractions: -; FM: +    Objective:  /72 (BP Location: Left arm, Patient Position: Sitting, Cuff Size: Large)   Ht 5' 6\" (1.676 m)   Wt 106 kg (233 lb)   LMP 2024 (Approximate)   BMI 37.61 kg/m²     General: Well appearing, no distress  Respiratory: Unlabored breathing  Abdomen: Soft, gravid, nontender  Extremities: Warm and well perfused.  Non tender.    Pregravid Weight/BMI: 112 kg (247 lb) (BMI 39.89)  Current Weight: 106 kg (233 lb)   Total Weight Gain: -6.35 kg (-14 lb)     Pre- Vitals      Flowsheet Row Most Recent Value   Prenatal Assessment    Fetal Heart Rate 138   Movement Present   Prenatal Vitals    Blood Pressure 120/72   Weight - Scale 106 kg (233 lb)   Urine Albumin/Glucose    Dilation/Effacement/Station    Vaginal Drainage    Edema              Shil V Cwalinski, DO  4/3/2025 3:46 PM     "

## 2025-04-16 ENCOUNTER — ROUTINE PRENATAL (OUTPATIENT)
Dept: OBGYN CLINIC | Facility: CLINIC | Age: 40
End: 2025-04-16
Payer: COMMERCIAL

## 2025-04-16 VITALS — BODY MASS INDEX: 38.29 KG/M2 | SYSTOLIC BLOOD PRESSURE: 120 MMHG | DIASTOLIC BLOOD PRESSURE: 68 MMHG | WEIGHT: 237.2 LBS

## 2025-04-16 DIAGNOSIS — O99.210 OBESITY AFFECTING PREGNANCY, ANTEPARTUM, UNSPECIFIED OBESITY TYPE: ICD-10-CM

## 2025-04-16 DIAGNOSIS — O99.340 DEPRESSION AFFECTING PREGNANCY, ANTEPARTUM: ICD-10-CM

## 2025-04-16 DIAGNOSIS — F32.A DEPRESSION AFFECTING PREGNANCY, ANTEPARTUM: ICD-10-CM

## 2025-04-16 DIAGNOSIS — O09.523 MULTIGRAVIDA OF ADVANCED MATERNAL AGE IN THIRD TRIMESTER: ICD-10-CM

## 2025-04-16 DIAGNOSIS — Z3A.35 35 WEEKS GESTATION OF PREGNANCY: ICD-10-CM

## 2025-04-16 DIAGNOSIS — Z23 NEED FOR TDAP VACCINATION: Primary | ICD-10-CM

## 2025-04-16 PROBLEM — Z3A.36 36 WEEKS GESTATION OF PREGNANCY: Status: ACTIVE | Noted: 2024-10-31

## 2025-04-16 LAB
SL AMB  POCT GLUCOSE, UA: NEGATIVE
SL AMB POCT URINE PROTEIN: NEGATIVE

## 2025-04-16 PROCEDURE — 81002 URINALYSIS NONAUTO W/O SCOPE: CPT | Performed by: NURSE PRACTITIONER

## 2025-04-16 PROCEDURE — PNV: Performed by: NURSE PRACTITIONER

## 2025-04-16 PROCEDURE — 90715 TDAP VACCINE 7 YRS/> IM: CPT | Performed by: NURSE PRACTITIONER

## 2025-04-16 PROCEDURE — 90471 IMMUNIZATION ADMIN: CPT | Performed by: NURSE PRACTITIONER

## 2025-04-16 NOTE — PROGRESS NOTES
Routine Prenatal Visit  [facility] - [place]  [address] [place] [address]    Assessment/Plan:  Fuad is a 39 y.o. year old  at 35w5d who presents for routine prenatal visit.     1. Need for Tdap vaccination  -     TDAP VACCINE GREATER THAN OR EQUAL TO 8YO IM  2. Obesity affecting pregnancy, antepartum, unspecified obesity type  Assessment & Plan:  Growth and NST scheduled for 37 weeks    3. Multigravida of advanced maternal age in third trimester  4. Depression affecting pregnancy, antepartum  Assessment & Plan:  Stable on Lexapro  5. 35 weeks gestation of pregnancy  Assessment & Plan:  Reviewed [facility], labor precautions  Stop Baby Aspirin @ 36 weeks  Call with any concerns  Return visit in 1 week  Orders:  -     POCT urine dip      Next OB Visit 1 weeks.    Subjective:     CC: Prenatal care    Fuad Dudley is a 39 y.o.  female who presents for routine prenatal care at 35w5d. Pt feels well and has no complaints.  Pregnancy ROS: no leakage of fluid, pelvic pain, or vaginal bleeding.  normal fetal movement.    The following portions of the patient's history were reviewed and updated as appropriate: allergies, current medications, past family history, past medical history, obstetric history, gynecologic history, past social history, past surgical history and problem list.      Objective:  /68   Wt 108 kg (237 lb 3.2 oz)   LMP 2024 (Approximate)   BMI 38.29 kg/m²   Pregravid Weight/BMI: 112 kg (247 lb) (BMI 39.89)  Current Weight: 108 kg (237 lb 3.2 oz)   Total Weight Gain: -4.445 kg (-9 lb 12.8 oz)   Pre-Argenis Vitals      Flowsheet Row Most Recent Value   Prenatal Assessment    Fetal Heart Rate 130   Fundal Height (cm) 37 cm   Movement Present   Prenatal Vitals    Blood Pressure 120/68   Weight - Scale 108 kg (237 lb 3.2 oz)   Urine Albumin/Glucose    Dilation/Effacement/Station    Vaginal Drainage    Draining Fluid No   Edema    LLE Edema None   RLE Edema None              General: Well appearing, no distress  Abdomen: Soft, gravid, nontender  Extremities: Non tender.

## 2025-04-16 NOTE — PATIENT INSTRUCTIONS
Please call the office if you feel that you are having contractions, leaking of fluid, vaginal bleeding, or if you are concerned about baby's movements.    -You may discontinue Baby aspirin.  
Detail Level: Simple

## 2025-04-16 NOTE — ASSESSMENT & PLAN NOTE
Reviewed FMC, labor precautions  Stop Baby Aspirin @ 36 weeks  Call with any concerns  Return visit in 1 week

## 2025-04-21 ENCOUNTER — TELEPHONE (OUTPATIENT)
Age: 40
End: 2025-04-21

## 2025-04-21 NOTE — TELEPHONE ENCOUNTER
Patient can't make her appointment on 4/25. I offered her 4/30, 5/1, and 5/2. She can't make those either.

## 2025-04-22 ENCOUNTER — HOSPITAL ENCOUNTER (OUTPATIENT)
Facility: HOSPITAL | Age: 40
Discharge: HOME | End: 2025-04-22
Attending: OBSTETRICS & GYNECOLOGY | Admitting: OBSTETRICS & GYNECOLOGY
Payer: COMMERCIAL

## 2025-04-22 ENCOUNTER — NURSE TRIAGE (OUTPATIENT)
Age: 40
End: 2025-04-22

## 2025-04-22 VITALS
TEMPERATURE: 97.7 F | RESPIRATION RATE: 18 BRPM | SYSTOLIC BLOOD PRESSURE: 136 MMHG | HEART RATE: 86 BPM | DIASTOLIC BLOOD PRESSURE: 78 MMHG

## 2025-04-22 PROBLEM — O47.9 UTERINE CONTRACTIONS: Status: ACTIVE | Noted: 2025-04-22

## 2025-04-22 PROCEDURE — 99212 OFFICE O/P EST SF 10 MIN: CPT

## 2025-04-22 PROCEDURE — G0463 HOSPITAL OUTPT CLINIC VISIT: HCPCS

## 2025-04-22 RX ADMIN — SODIUM CHLORIDE, SODIUM LACTATE, POTASSIUM CHLORIDE, AND CALCIUM CHLORIDE 1000 ML: .6; .31; .03; .02 INJECTION, SOLUTION INTRAVENOUS at 09:40

## 2025-04-22 NOTE — TELEPHONE ENCOUNTER
"FOLLOW UP:   Epic Secure Chat sent to Dr Panchal- on call  Epic Secure Chat sent to [facility] RN: Kristen Hernandez RN     REASON FOR CONVERSATION: Pregnancy Problem    SYMPTOMS: karen since 5pm yesterday. Vaginal pressure     OTHER: Pt is 36w4d , pt c/o having contractions since 5pm last night. Pt saying that the contractions have been coming and going, pt hasn't been timing them but some are closer together. Pt also has vaginal pressure started yesterday as well.  Pt denies leaking fluid from vagina, fever, hand/facial swelling    Pt is advised to go to L&D now for further evaluation. Pt saying that she needs to speak to her boss first to see if she can leave without a write up. Pt was notified that if she's not able to leave work, to call back pt verbalized understanding.       DISPOSITION: go to LD NOW    Reason for Disposition   Contractions > 10 minutes apart that persist > 24 hours, and no improvement using Care Advice    Answer Assessment - Initial Assessment Questions  1. ONSET: \"When did the symptoms begin?\"         Yesterday 5pm   2. CONTRACTIONS: \"Describe the contractions that you are having.\" (e.g., duration, frequency, regularity, severity)      Pt unsure of timing, pt saying some contractions are closer and spacing out   3. PREGNANCY: \"How many weeks pregnant are you?\"      36w4d   4. GOOD: \"What date are you expecting to deliver?\"      25  5. PARITY: \"Have you had a baby before?\" If Yes, ask: \"How long did the labor last?\"      Induced with other pregnancies   6. FETAL MOVEMENT: \"Has the baby's movement decreased or changed significantly from normal?\"      Pt reporting +FM.   7. OTHER SYMPTOMS: \"Do you have any other symptoms?\" (e.g., leaking fluid from vagina, fever, hand/facial swelling)      Pt denies  leaking fluid from vagina, fever, hand/facial swelling    Protocols used: Pregnancy - Labor - -Adult-OH    "

## 2025-04-22 NOTE — TELEPHONE ENCOUNTER
LVM w/office number for PT to call back and r/s 4/25 growth/NST to another day/time within same week.

## 2025-04-22 NOTE — PROGRESS NOTES
Patient up to bathroom, Nancy DODD performed SVE.  Patient cervix closed.  Still feeling crampy.  Orders for IVF and oral fluids.

## 2025-04-22 NOTE — LETTER
[facility]  [address]  [place] [id number]  Dept: [phone]    April 22, 2025     Patient: Fuad Dudley   YOB: 1985   Date of Visit: 4/22/2025       To Whom it May Concern:    Fuad Dudley is under my professional care. She was seen in the hospital from 4/22/2025 to 04/22/25. She may return to work on 4/23/25 with the following limitations must be able to take breaks to drink water .    If you have any questions or concerns, please don't hesitate to call.         Sincerely,          Bernarda Smith CNM

## 2025-04-23 ENCOUNTER — ROUTINE PRENATAL (OUTPATIENT)
Dept: OBGYN CLINIC | Facility: CLINIC | Age: 40
End: 2025-04-23
Payer: COMMERCIAL

## 2025-04-23 VITALS
DIASTOLIC BLOOD PRESSURE: 76 MMHG | HEIGHT: 66 IN | BODY MASS INDEX: 37.77 KG/M2 | SYSTOLIC BLOOD PRESSURE: 132 MMHG | WEIGHT: 235 LBS

## 2025-04-23 DIAGNOSIS — O09.523 MULTIGRAVIDA OF ADVANCED MATERNAL AGE IN THIRD TRIMESTER: ICD-10-CM

## 2025-04-23 DIAGNOSIS — F32.A DEPRESSION AFFECTING PREGNANCY, ANTEPARTUM: ICD-10-CM

## 2025-04-23 DIAGNOSIS — Z36.85 ANTENATAL SCREENING FOR STREPTOCOCCUS B: ICD-10-CM

## 2025-04-23 DIAGNOSIS — O99.210 OBESITY AFFECTING PREGNANCY, ANTEPARTUM, UNSPECIFIED OBESITY TYPE: ICD-10-CM

## 2025-04-23 DIAGNOSIS — Z3A.36 36 WEEKS GESTATION OF PREGNANCY: Primary | ICD-10-CM

## 2025-04-23 DIAGNOSIS — O99.340 DEPRESSION AFFECTING PREGNANCY, ANTEPARTUM: ICD-10-CM

## 2025-04-23 LAB
SL AMB  POCT GLUCOSE, UA: NORMAL
SL AMB POCT URINE PROTEIN: NORMAL

## 2025-04-23 PROCEDURE — PNV: Performed by: NURSE PRACTITIONER

## 2025-04-23 PROCEDURE — 81002 URINALYSIS NONAUTO W/O SCOPE: CPT | Performed by: NURSE PRACTITIONER

## 2025-04-23 NOTE — PROGRESS NOTES
"Routine Prenatal Visit  [facility] - [place]  [address] [place] [address]    Assessment/Plan:  Fuad is a 39 y.o. year old  at 36w5d who presents for routine prenatal visit.     1. 36 weeks gestation of pregnancy  Assessment & Plan:  GBS collected, delivery consent signed  Reviewed [facility], labor precautions  Recommend support band for pelvic pressure  Call with any concerns  Return visit 1 week  Orders:  -     POCT urine dip  2.  screening for streptococcus B  -     Strep B DNA probe, amplification  3. Obesity affecting pregnancy, antepartum, unspecified obesity type  Assessment & Plan:  Growth and NST scheduled for 37 weeks  4. Multigravida of advanced maternal age in third trimester  5. Depression affecting pregnancy, antepartum  Assessment & Plan:  Stable on Lexapro      Next OB Visit 1 weeks.    Subjective:     CC: Prenatal care    Fuad Dudley is a 39 y.o.  female who presents for routine prenatal care at 36w5d. Went to L+D yesterday with cramping and pressure. Given fluids and had reassuring testing. Still having pressure.  Pregnancy ROS: no leakage of fluid, pelvic pain, or vaginal bleeding.  normal fetal movement.    The following portions of the patient's history were reviewed and updated as appropriate: allergies, current medications, past family history, past medical history, obstetric history, gynecologic history, past social history, past surgical history and problem list.      Objective:  /76 (BP Location: Left arm, Patient Position: Sitting, Cuff Size: Standard)   Ht 5' 6\" (1.676 m)   Wt 107 kg (235 lb)   LMP 2024 (Approximate)   BMI 37.93 kg/m²   Pregravid Weight/BMI: 112 kg (247 lb) (BMI 39.89)  Current Weight: 107 kg (235 lb)   Total Weight Gain: -5.443 kg (-12 lb)   Pre- Vitals      Flowsheet Row Most Recent Value   Prenatal Assessment    Fetal Heart Rate 142   Presentation Vertex   Prenatal Vitals    Blood Pressure 132/76   Weight - " Scale 107 kg (235 lb)   Urine Albumin/Glucose    Dilation/Effacement/Station    Vaginal Drainage    Draining Fluid No   Edema    LLE Edema None   RLE Edema None             General: Well appearing, no distress  Abdomen: Soft, gravid, nontender  Extremities: Non tender.

## 2025-04-23 NOTE — ASSESSMENT & PLAN NOTE
GBS collected, delivery consent signed  Reviewed FMC, labor precautions  Recommend support band for pelvic pressure  Call with any concerns  Return visit 1 week

## 2025-04-25 LAB — GP B STREP DNA SPEC QL NAA+PROBE: POSITIVE

## 2025-04-28 ENCOUNTER — RESULTS FOLLOW-UP (OUTPATIENT)
Dept: OBGYN CLINIC | Facility: CLINIC | Age: 40
End: 2025-04-28

## 2025-04-28 PROBLEM — O99.820 GBS (GROUP B STREPTOCOCCUS CARRIER), +RV CULTURE, CURRENTLY PREGNANT: Status: ACTIVE | Noted: 2025-04-28

## 2025-04-29 ENCOUNTER — TELEPHONE (OUTPATIENT)
Dept: OBGYN CLINIC | Facility: CLINIC | Age: 40
End: 2025-04-29

## 2025-04-29 ENCOUNTER — ROUTINE PRENATAL (OUTPATIENT)
Dept: OBGYN CLINIC | Facility: CLINIC | Age: 40
End: 2025-04-29
Payer: COMMERCIAL

## 2025-04-29 VITALS
SYSTOLIC BLOOD PRESSURE: 128 MMHG | HEIGHT: 66 IN | DIASTOLIC BLOOD PRESSURE: 76 MMHG | BODY MASS INDEX: 37.61 KG/M2 | WEIGHT: 234 LBS

## 2025-04-29 DIAGNOSIS — O99.820 GBS (GROUP B STREPTOCOCCUS CARRIER), +RV CULTURE, CURRENTLY PREGNANT: ICD-10-CM

## 2025-04-29 DIAGNOSIS — Z3A.37 37 WEEKS GESTATION OF PREGNANCY: ICD-10-CM

## 2025-04-29 DIAGNOSIS — O99.340 DEPRESSION AFFECTING PREGNANCY, ANTEPARTUM: ICD-10-CM

## 2025-04-29 DIAGNOSIS — O99.210 OBESITY AFFECTING PREGNANCY, ANTEPARTUM, UNSPECIFIED OBESITY TYPE: Primary | ICD-10-CM

## 2025-04-29 DIAGNOSIS — F32.A DEPRESSION AFFECTING PREGNANCY, ANTEPARTUM: ICD-10-CM

## 2025-04-29 DIAGNOSIS — O09.523 MULTIGRAVIDA OF ADVANCED MATERNAL AGE IN THIRD TRIMESTER: ICD-10-CM

## 2025-04-29 LAB
SL AMB  POCT GLUCOSE, UA: NORMAL
SL AMB POCT URINE PROTEIN: NORMAL

## 2025-04-29 PROCEDURE — 81002 URINALYSIS NONAUTO W/O SCOPE: CPT | Performed by: OBSTETRICS & GYNECOLOGY

## 2025-04-29 PROCEDURE — PNV: Performed by: OBSTETRICS & GYNECOLOGY

## 2025-04-29 NOTE — PROGRESS NOTES
"Routine Prenatal Visit  [facility] - [place]  [address] [place] [address]    Assessment/Plan:  Fuad is a 39 y.o. year old  at 37w4d who presents for routine prenatal visit.     1. Obesity affecting pregnancy, antepartum, unspecified obesity type  Assessment & Plan:  Start  testing  2. Multigravida of advanced maternal age in third trimester  3. Depression affecting pregnancy, antepartum  Assessment & Plan:  Doing well on lexapro  4. 37 weeks gestation of pregnancy  Assessment & Plan:  Will want IOL at 40 weeks  Orders:  -     POCT urine dip  5. GBS (group B Streptococcus carrier), +RV culture, currently pregnant  Assessment & Plan:  Treat in labor        Subjective:   Fuad Dudley is a 39 y.o.  who presents for routine prenatal care at 37w4d.  Complaints today: Denies  LOF: -; VB: -; Contractions: -; FM: +    Objective:  /76 (BP Location: Left arm, Patient Position: Sitting, Cuff Size: Standard)   Ht 5' 6\" (1.676 m)   Wt 106 kg (234 lb)   LMP 2024 (Approximate)   BMI 37.77 kg/m²     General: Well appearing, no distress  Respiratory: Unlabored breathing  Abdomen: Soft, gravid, nontender  Extremities: Warm and well perfused.  Non tender.    Pregravid Weight/BMI: 112 kg (247 lb) (BMI 39.89)  Current Weight: 106 kg (234 lb)   Total Weight Gain: -5.897 kg (-13 lb)     Pre- Vitals      Flowsheet Row Most Recent Value   Prenatal Assessment    Fetal Heart Rate 140   Movement Present   Presentation Vertex   Prenatal Vitals    Blood Pressure 128/76   Weight - Scale 106 kg (234 lb)   Urine Albumin/Glucose    Dilation/Effacement/Station    Vaginal Drainage    Edema              Shil V Florencia, DO  2025 3:50 PM     "

## 2025-04-30 NOTE — PATIENT INSTRUCTIONS
Thank you for choosing us for your  care today.  If you have any questions about your ultrasound or care, please do not hesitate to contact us or your primary obstetrician.        Some general instructions for your pregnancy are:    Exercise: Aim for 150 minutes per week of regular exercise.  Walking is great!  Nutrition: Choose healthy sources of calcium, iron, and protein.  Avoid ultraprocessed foods and added sugar.  Learn about Preeclampsia: preeclampsia is a common, potentially serious high blood pressure complication in pregnancy.  A blood pressure of 140mmHg (systolic or top number) or 90mmHg (diastolic or bottom number) should be evaluated by your doctor.  Aspirin is sometimes prescribed in early pregnancy to prevent preeclampsia in women with risk factors - ask your obstetrician if you should be on this medication.  For more resources, visit:  https://www.highriskpregnancyinfo.org/preeclampsia  If you smoke, please try to quit completely but also try to reduce your smoking by as much as possible (as soon as possible).  Do not vape.  Please also avoid cannabis products.  Other warning signs to watch out for in pregnancy or postpartum: chest pain, obstructed breathing or shortness of breath, seizures, thoughts of hurting yourself or your baby, bleeding, a painful or swollen leg, fever, or headache (see [facility]).  If these happen call 911.  Itching is also not normal in pregnancy and if you experience this, especially over your hands and feet, potentially worse at night, notify your doctors.     Kick Counts in Pregnancy   AMBULATORY CARE:   Kick counts  measure how much your baby is moving in your womb. A kick from your baby can be felt as a twist, turn, swish, roll, or jab. It is common to feel your baby kicking at 26 to 28 weeks of pregnancy. You may feel your baby kick as early as 20 weeks of pregnancy. You may want to start counting at 28 weeks.   Contact your  doctor immediately if:   You feel a change in the number of kicks or movements of your baby.      You feel fewer than 10 kicks within 2 hours.      You have questions or concerns about your baby's movements.     Why measure kick counts:  Your baby's movement may provide information about your baby's health. He or she may move less, or not at all, if there are problems. Your baby may move less if he or she is not getting enough oxygen or nutrition from the placenta. Do not smoke while you are pregnant. Smoking decreases the amount of oxygen that gets to your baby. Talk to your healthcare provider if you need help to quit smoking. Tell your healthcare provider as soon as you feel a change in your baby's movements.  When to measure kick counts:   Measure kick counts at the same time every day.       Measure kick counts when your baby is awake and most active. Your baby may be most active in the evening.     How to measure kick counts:  Check that your baby is awake before you measure kick counts. You can wake up your baby by lightly pushing on your belly, walking, or drinking something cold. Your healthcare provider may tell you different ways to measure kick counts. You may be told to do the following:  Use a chart or clock to keep track of the time you start and finish counting.      Sit in a chair or lie on your left side.      Place your hands on the largest part of your belly.      Count until you reach 10 kicks. Write down how much time it takes to count 10 kicks.      It may take 30 minutes to 2 hours to count 10 kicks. It should not take more than 2 hours to count 10 kicks.     Follow up with your doctor as directed:  Write down your questions so you remember to ask them during your visits.   © Copyright [employer] 2023 Information is for End User's use only and may not be sold, redistributed or otherwise used for commercial purposes.  The above information is an  only. It is not intended as  medical advice for individual conditions or treatments. Talk to your doctor, nurse or pharmacist before following any medical regimen to see if it is safe and effective for you. Nonstress Test for Pregnancy   WHAT YOU NEED TO KNOW:   What do I need to know about a nonstress test?  A nonstress test measures your baby's heart rate and movements. Nonstress means that no stress will be placed on your baby during the test.  How do I prepare for a nonstress test?  Your healthcare provider will talk to you about how to prepare for this test. Your provider may tell you to eat and drink plenty of liquids before your test. If you smoke, you may be asked not to smoke within 2 hours before the test. Your provider will also tell you which medicines to take or not take on the day of your test.  What will happen during a nonstress test?  You may be asked to lie down or recline back for the test on a bed. One or 2 belts with sensors will be placed around your abdomen. Your baby's heart rate will be recorded with a machine. If your baby does not move, your baby may be asleep. Your healthcare provider may make a noise near your abdomen to try to wake your baby. The test usually takes about 20 minutes, but can take longer if your baby needs to be awakened.        What do I need to know about the test results?  Your baby will be expected to move at least 2 times for a certain amount of time. Your baby's heart rate will be expected to go up by a certain number of beats per minute during movement. If your baby does not move as expected, the test may need to be repeated or you may need other tests.  CARE AGREEMENT:   You have the right to help plan your care. Learn about your health condition and how it may be treated. Discuss treatment options with your healthcare providers to decide what care you want to receive. You always have the right to refuse treatment. The above information is an  only. It is not intended as  medical advice for individual conditions or treatments. Talk to your doctor, nurse or pharmacist before following any medical regimen to see if it is safe and effective for you.  © 2023 Information is for End User's use only and may not be sold, redistributed or otherwise used for commercial purposes. Thank you for choosing us for your  care today.  If you have any questions about your ultrasound or care, please do not hesitate to contact us or your primary obstetrician.        Some general instructions for your pregnancy are:    Exercise: Aim for 150 minutes per week of regular exercise.  Walking is great!  Nutrition: Choose healthy sources of calcium, iron, and protein.  Avoid ultraprocessed foods and added sugar.  Learn about Preeclampsia: preeclampsia is a common, potentially serious high blood pressure complication in pregnancy.  A blood pressure of 140mmHg (systolic or top number) or 90mmHg (diastolic or bottom number) should be evaluated by your doctor.  Aspirin is sometimes prescribed in early pregnancy to prevent preeclampsia in women with risk factors - ask your obstetrician if you should be on this medication.  For more resources, visit:  https://www.highriskpregnancyinfo.org/preeclampsia  If you smoke, please try to quit completely but also try to reduce your smoking by as much as possible (as soon as possible).  Do not vape.  Please also avoid cannabis products.  Other warning signs to watch out for in pregnancy or postpartum: chest pain, obstructed breathing or shortness of breath, seizures, thoughts of hurting yourself or your baby, bleeding, a painful or swollen leg, fever, or headache (see [facility] POST-BIRTH Warning Signs campaign).  If these happen call 911.  Itching is also not normal in pregnancy and if you experience this, especially over your hands and feet, potentially worse at night, notify your doctors.

## 2025-05-01 NOTE — PROGRESS NOTES
Please refer to the [facility] ultrasound report in Ob Procedures for additional information regarding today's visit

## 2025-05-02 ENCOUNTER — ULTRASOUND (OUTPATIENT)
Dept: PERINATAL CARE | Facility: CLINIC | Age: 40
End: 2025-05-02
Payer: COMMERCIAL

## 2025-05-02 VITALS
BODY MASS INDEX: 37.28 KG/M2 | WEIGHT: 232 LBS | HEIGHT: 66 IN | DIASTOLIC BLOOD PRESSURE: 64 MMHG | SYSTOLIC BLOOD PRESSURE: 122 MMHG | HEART RATE: 73 BPM

## 2025-05-02 DIAGNOSIS — O99.213 MATERNAL OBESITY, ANTEPARTUM, THIRD TRIMESTER: ICD-10-CM

## 2025-05-02 DIAGNOSIS — O99.210 OBESITY AFFECTING PREGNANCY, ANTEPARTUM, UNSPECIFIED OBESITY TYPE: Primary | ICD-10-CM

## 2025-05-02 DIAGNOSIS — Z36.89 ENCOUNTER FOR ULTRASOUND TO ASSESS FETAL GROWTH: ICD-10-CM

## 2025-05-02 DIAGNOSIS — E66.812 CLASS 2 OBESITY: ICD-10-CM

## 2025-05-02 DIAGNOSIS — O09.523 MULTIGRAVIDA OF ADVANCED MATERNAL AGE IN THIRD TRIMESTER: ICD-10-CM

## 2025-05-02 DIAGNOSIS — Z3A.37 37 WEEKS GESTATION OF PREGNANCY: ICD-10-CM

## 2025-05-02 PROCEDURE — 76816 OB US FOLLOW-UP PER FETUS: CPT | Performed by: OBSTETRICS & GYNECOLOGY

## 2025-05-02 PROCEDURE — 59025 FETAL NON-STRESS TEST: CPT | Performed by: NURSE PRACTITIONER

## 2025-05-02 PROCEDURE — 99213 OFFICE O/P EST LOW 20 MIN: CPT | Performed by: NURSE PRACTITIONER

## 2025-05-02 NOTE — PROGRESS NOTES
Non-Stress Testing:    Non-Stress test, equipment, procedure, and expected outcomes explained. Reviewed fetal kick counts and when to call OB.Verified patient understanding of fetal kick counts with teach back method. Patient reports feeling daily fetal movements. Patient has no questions or concerns.     Reviewed non-stress test with JOE Michael in-person

## 2025-05-07 ENCOUNTER — NURSE TRIAGE (OUTPATIENT)
Age: 40
End: 2025-05-07

## 2025-05-07 ENCOUNTER — HOSPITAL ENCOUNTER (INPATIENT)
Facility: HOSPITAL | Age: 40
LOS: 4 days | Discharge: HOME | End: 2025-05-11
Attending: OBSTETRICS & GYNECOLOGY | Admitting: OBSTETRICS & GYNECOLOGY
Payer: COMMERCIAL

## 2025-05-07 ENCOUNTER — ANESTHESIA (INPATIENT)
Dept: LABOR AND DELIVERY | Facility: HOSPITAL | Age: 40
End: 2025-05-07
Payer: COMMERCIAL

## 2025-05-07 ENCOUNTER — ANESTHESIA EVENT (INPATIENT)
Dept: LABOR AND DELIVERY | Facility: HOSPITAL | Age: 40
End: 2025-05-07
Payer: COMMERCIAL

## 2025-05-07 DIAGNOSIS — Z98.891 STATUS POST PRIMARY LOW TRANSVERSE CESAREAN SECTION: ICD-10-CM

## 2025-05-07 DIAGNOSIS — O36.8390 NON-REASSURING ELECTRONIC FETAL MONITORING TRACING: Primary | ICD-10-CM

## 2025-05-07 LAB
ABO GROUP BLD: NORMAL
BASE EXCESS BLDCOA CALC-SCNC: -6.9 MMOL/L (ref 3–11)
BASE EXCESS BLDCOV CALC-SCNC: -10.5 MMOL/L (ref 1–9)
BASOPHILS # BLD AUTO: 0.03 THOUSANDS/ÂΜL (ref 0–0.1)
BASOPHILS NFR BLD AUTO: 0 % (ref 0–1)
BLD GP AB SCN SERPL QL: NEGATIVE
EOSINOPHIL # BLD AUTO: 0.03 THOUSAND/ÂΜL (ref 0–0.61)
EOSINOPHIL NFR BLD AUTO: 0 % (ref 0–6)
ERYTHROCYTE [DISTWIDTH] IN BLOOD BY AUTOMATED COUNT: 14.7 % (ref 11.6–15.1)
HCO3 BLDCOA-SCNC: 25.3 MMOL/L (ref 17.3–27.3)
HCO3 BLDCOV-SCNC: 18.4 MMOL/L (ref 12.2–28.6)
HCT VFR BLD AUTO: 34.5 % (ref 34.8–46.1)
HGB BLD-MCNC: 11.3 G/DL (ref 11.5–15.4)
IMM GRANULOCYTES # BLD AUTO: 0.04 THOUSAND/UL (ref 0–0.2)
IMM GRANULOCYTES NFR BLD AUTO: 0 % (ref 0–2)
LYMPHOCYTES # BLD AUTO: 1.93 THOUSANDS/ÂΜL (ref 0.6–4.47)
LYMPHOCYTES NFR BLD AUTO: 21 % (ref 14–44)
MCH RBC QN AUTO: 28 PG (ref 26.8–34.3)
MCHC RBC AUTO-ENTMCNC: 32.8 G/DL (ref 31.4–37.4)
MCV RBC AUTO: 85 FL (ref 82–98)
MONOCYTES # BLD AUTO: 0.7 THOUSAND/ÂΜL (ref 0.17–1.22)
MONOCYTES NFR BLD AUTO: 8 % (ref 4–12)
NEUTROPHILS # BLD AUTO: 6.64 THOUSANDS/ÂΜL (ref 1.85–7.62)
NEUTS SEG NFR BLD AUTO: 71 % (ref 43–75)
NRBC BLD AUTO-RTO: 0 /100 WBCS
O2 CT VFR BLDCOA CALC: 2.6 ML/DL
OXYHGB MFR BLDCOA: 9.2 %
OXYHGB MFR BLDCOV: 73.9 %
PCO2 BLDCOA: 79.5 MM[HG] (ref 30–60)
PCO2 BLDCOV: 50.9 MM HG (ref 27–43)
PH BLDCOA: 7.12 [PH] (ref 7.23–7.43)
PH BLDCOV: 7.18 [PH] (ref 7.19–7.49)
PLATELET # BLD AUTO: 215 THOUSANDS/UL (ref 149–390)
PMV BLD AUTO: 11.1 FL (ref 8.9–12.7)
PO2 BLDCOA: 10.1 MM HG (ref 5–25)
PO2 BLDCOV: 37.9 MM HG (ref 15–45)
RBC # BLD AUTO: 4.04 MILLION/UL (ref 3.81–5.12)
RH BLD: POSITIVE
SAO2 % BLDCOV: 21.8 ML/DL
SPECIMEN EXPIRATION DATE: NORMAL
WBC # BLD AUTO: 9.37 THOUSAND/UL (ref 4.31–10.16)

## 2025-05-07 PROCEDURE — 4A1HXCZ MONITORING OF PRODUCTS OF CONCEPTION, CARDIAC RATE, EXTERNAL APPROACH: ICD-10-PCS | Performed by: OBSTETRICS & GYNECOLOGY

## 2025-05-07 PROCEDURE — NC001 PR NO CHARGE: Performed by: ADVANCED PRACTICE MIDWIFE

## 2025-05-07 PROCEDURE — 85025 COMPLETE CBC W/AUTO DIFF WBC: CPT | Performed by: OBSTETRICS & GYNECOLOGY

## 2025-05-07 PROCEDURE — 88307 TISSUE EXAM BY PATHOLOGIST: CPT | Performed by: STUDENT IN AN ORGANIZED HEALTH CARE EDUCATION/TRAINING PROGRAM

## 2025-05-07 PROCEDURE — 99212 OFFICE O/P EST SF 10 MIN: CPT

## 2025-05-07 PROCEDURE — 86780 TREPONEMA PALLIDUM: CPT | Performed by: OBSTETRICS & GYNECOLOGY

## 2025-05-07 PROCEDURE — 86850 RBC ANTIBODY SCREEN: CPT | Performed by: OBSTETRICS & GYNECOLOGY

## 2025-05-07 PROCEDURE — 94762 N-INVAS EAR/PLS OXIMTRY CONT: CPT

## 2025-05-07 PROCEDURE — 86900 BLOOD TYPING SEROLOGIC ABO: CPT | Performed by: OBSTETRICS & GYNECOLOGY

## 2025-05-07 PROCEDURE — 86901 BLOOD TYPING SEROLOGIC RH(D): CPT | Performed by: OBSTETRICS & GYNECOLOGY

## 2025-05-07 PROCEDURE — 59510 CESAREAN DELIVERY: CPT | Performed by: OBSTETRICS & GYNECOLOGY

## 2025-05-07 PROCEDURE — G0463 HOSPITAL OUTPT CLINIC VISIT: HCPCS

## 2025-05-07 PROCEDURE — 82805 BLOOD GASES W/O2 SATURATION: CPT | Performed by: OBSTETRICS & GYNECOLOGY

## 2025-05-07 RX ORDER — SUCCINYLCHOLINE/SOD CL,ISO/PF 100 MG/5ML
SYRINGE (ML) INTRAVENOUS AS NEEDED
Status: DISCONTINUED | OUTPATIENT
Start: 2025-05-07 | End: 2025-05-07

## 2025-05-07 RX ORDER — DEXAMETHASONE SODIUM PHOSPHATE 10 MG/ML
INJECTION, SOLUTION INTRAMUSCULAR; INTRAVENOUS AS NEEDED
Status: DISCONTINUED | OUTPATIENT
Start: 2025-05-07 | End: 2025-05-07

## 2025-05-07 RX ORDER — SODIUM CHLORIDE, SODIUM LACTATE, POTASSIUM CHLORIDE, CALCIUM CHLORIDE 600; 310; 30; 20 MG/100ML; MG/100ML; MG/100ML; MG/100ML
125 INJECTION, SOLUTION INTRAVENOUS CONTINUOUS
Status: DISCONTINUED | OUTPATIENT
Start: 2025-05-07 | End: 2025-05-09

## 2025-05-07 RX ORDER — PROPOFOL 10 MG/ML
INJECTION, EMULSION INTRAVENOUS AS NEEDED
Status: DISCONTINUED | OUTPATIENT
Start: 2025-05-07 | End: 2025-05-07

## 2025-05-07 RX ORDER — ONDANSETRON 2 MG/ML
4 INJECTION INTRAMUSCULAR; INTRAVENOUS ONCE AS NEEDED
Status: DISCONTINUED | OUTPATIENT
Start: 2025-05-07 | End: 2025-05-11 | Stop reason: HOSPADM

## 2025-05-07 RX ORDER — DOCUSATE SODIUM 100 MG/1
100 CAPSULE, LIQUID FILLED ORAL 2 TIMES DAILY
Status: DISCONTINUED | OUTPATIENT
Start: 2025-05-07 | End: 2025-05-11 | Stop reason: HOSPADM

## 2025-05-07 RX ORDER — ONDANSETRON 2 MG/ML
INJECTION INTRAMUSCULAR; INTRAVENOUS AS NEEDED
Status: DISCONTINUED | OUTPATIENT
Start: 2025-05-07 | End: 2025-05-07

## 2025-05-07 RX ORDER — BISACODYL 10 MG
10 SUPPOSITORY, RECTAL RECTAL DAILY PRN
Status: DISCONTINUED | OUTPATIENT
Start: 2025-05-07 | End: 2025-05-11 | Stop reason: HOSPADM

## 2025-05-07 RX ORDER — BUPIVACAINE HYDROCHLORIDE 2.5 MG/ML
30 INJECTION, SOLUTION EPIDURAL; INFILTRATION; INTRACAUDAL; PERINEURAL ONCE AS NEEDED
Status: DISCONTINUED | OUTPATIENT
Start: 2025-05-07 | End: 2025-05-11 | Stop reason: HOSPADM

## 2025-05-07 RX ORDER — ONDANSETRON 2 MG/ML
4 INJECTION INTRAMUSCULAR; INTRAVENOUS EVERY 6 HOURS PRN
Status: DISCONTINUED | OUTPATIENT
Start: 2025-05-07 | End: 2025-05-11 | Stop reason: HOSPADM

## 2025-05-07 RX ORDER — ESCITALOPRAM OXALATE 10 MG/1
10 TABLET ORAL
Status: DISCONTINUED | OUTPATIENT
Start: 2025-05-07 | End: 2025-05-11 | Stop reason: HOSPADM

## 2025-05-07 RX ORDER — FENTANYL CITRATE 50 UG/ML
INJECTION, SOLUTION INTRAMUSCULAR; INTRAVENOUS AS NEEDED
Status: DISCONTINUED | OUTPATIENT
Start: 2025-05-07 | End: 2025-05-07

## 2025-05-07 RX ORDER — TRANEXAMIC ACID 10 MG/ML
INJECTION, SOLUTION INTRAVENOUS AS NEEDED
Status: DISCONTINUED | OUTPATIENT
Start: 2025-05-07 | End: 2025-05-07

## 2025-05-07 RX ORDER — ACETAMINOPHEN 325 MG/1
650 TABLET ORAL EVERY 6 HOURS SCHEDULED
Status: DISCONTINUED | OUTPATIENT
Start: 2025-05-07 | End: 2025-05-11 | Stop reason: HOSPADM

## 2025-05-07 RX ORDER — OXYTOCIN/0.9 % SODIUM CHLORIDE 30/500 ML
PLASTIC BAG, INJECTION (ML) INTRAVENOUS CONTINUOUS PRN
Status: DISCONTINUED | OUTPATIENT
Start: 2025-05-07 | End: 2025-05-07

## 2025-05-07 RX ORDER — MORPHINE SULFATE 0.5 MG/ML
INJECTION, SOLUTION EPIDURAL; INTRATHECAL; INTRAVENOUS AS NEEDED
Status: DISCONTINUED | OUTPATIENT
Start: 2025-05-07 | End: 2025-05-07

## 2025-05-07 RX ORDER — HYDROMORPHONE HCL/PF 1 MG/ML
SYRINGE (ML) INJECTION AS NEEDED
Status: DISCONTINUED | OUTPATIENT
Start: 2025-05-07 | End: 2025-05-07

## 2025-05-07 RX ORDER — OXYTOCIN/0.9 % SODIUM CHLORIDE 30/500 ML
125 PLASTIC BAG, INJECTION (ML) INTRAVENOUS ONCE
Status: COMPLETED | OUTPATIENT
Start: 2025-05-07 | End: 2025-05-07

## 2025-05-07 RX ORDER — KETOROLAC TROMETHAMINE 30 MG/ML
INJECTION, SOLUTION INTRAMUSCULAR; INTRAVENOUS AS NEEDED
Status: DISCONTINUED | OUTPATIENT
Start: 2025-05-07 | End: 2025-05-07

## 2025-05-07 RX ORDER — MIDAZOLAM HYDROCHLORIDE 2 MG/2ML
INJECTION, SOLUTION INTRAMUSCULAR; INTRAVENOUS AS NEEDED
Status: DISCONTINUED | OUTPATIENT
Start: 2025-05-07 | End: 2025-05-07

## 2025-05-07 RX ORDER — IBUPROFEN 600 MG/1
600 TABLET, FILM COATED ORAL EVERY 6 HOURS
Status: DISCONTINUED | OUTPATIENT
Start: 2025-05-08 | End: 2025-05-11 | Stop reason: HOSPADM

## 2025-05-07 RX ORDER — FENTANYL CITRATE/PF 50 MCG/ML
25 SYRINGE (ML) INJECTION
Status: DISCONTINUED | OUTPATIENT
Start: 2025-05-07 | End: 2025-05-11 | Stop reason: HOSPADM

## 2025-05-07 RX ORDER — OXYTOCIN/RINGER'S LACTATE 30/500 ML
PLASTIC BAG, INJECTION (ML) INTRAVENOUS CONTINUOUS PRN
Status: DISCONTINUED | OUTPATIENT
Start: 2025-05-07 | End: 2025-05-07

## 2025-05-07 RX ORDER — KETOROLAC TROMETHAMINE 30 MG/ML
30 INJECTION, SOLUTION INTRAMUSCULAR; INTRAVENOUS EVERY 6 HOURS SCHEDULED
Status: DISPENSED | OUTPATIENT
Start: 2025-05-07 | End: 2025-05-08

## 2025-05-07 RX ORDER — HYDROMORPHONE HCL/PF 1 MG/ML
0.4 SYRINGE (ML) INJECTION
Status: DISCONTINUED | OUTPATIENT
Start: 2025-05-07 | End: 2025-05-11 | Stop reason: HOSPADM

## 2025-05-07 RX ORDER — DIPHENHYDRAMINE HYDROCHLORIDE 50 MG/ML
25 INJECTION, SOLUTION INTRAMUSCULAR; INTRAVENOUS EVERY 6 HOURS PRN
Status: DISCONTINUED | OUTPATIENT
Start: 2025-05-07 | End: 2025-05-11 | Stop reason: HOSPADM

## 2025-05-07 RX ORDER — CEFAZOLIN SODIUM 1 G/3ML
INJECTION, POWDER, FOR SOLUTION INTRAMUSCULAR; INTRAVENOUS AS NEEDED
Status: DISCONTINUED | OUTPATIENT
Start: 2025-05-07 | End: 2025-05-07

## 2025-05-07 RX ORDER — METOCLOPRAMIDE HYDROCHLORIDE 5 MG/ML
INJECTION INTRAMUSCULAR; INTRAVENOUS AS NEEDED
Status: DISCONTINUED | OUTPATIENT
Start: 2025-05-07 | End: 2025-05-07

## 2025-05-07 RX ORDER — SIMETHICONE 80 MG
80 TABLET,CHEWABLE ORAL 4 TIMES DAILY PRN
Status: DISCONTINUED | OUTPATIENT
Start: 2025-05-07 | End: 2025-05-11 | Stop reason: HOSPADM

## 2025-05-07 RX ORDER — MAGNESIUM HYDROXIDE/ALUMINUM HYDROXICE/SIMETHICONE 120; 1200; 1200 MG/30ML; MG/30ML; MG/30ML
15 SUSPENSION ORAL EVERY 6 HOURS PRN
Status: DISCONTINUED | OUTPATIENT
Start: 2025-05-07 | End: 2025-05-11 | Stop reason: HOSPADM

## 2025-05-07 RX ADMIN — ACETAMINOPHEN 650 MG: 325 TABLET, FILM COATED ORAL at 18:28

## 2025-05-07 RX ADMIN — METOCLOPRAMIDE 10 MG: 5 INJECTION, SOLUTION INTRAMUSCULAR; INTRAVENOUS at 13:17

## 2025-05-07 RX ADMIN — DEXAMETHASONE SODIUM PHOSPHATE 10 MG: 10 INJECTION, SOLUTION INTRAMUSCULAR; INTRAVENOUS at 13:17

## 2025-05-07 RX ADMIN — SODIUM CHLORIDE, SODIUM LACTATE, POTASSIUM CHLORIDE, AND CALCIUM CHLORIDE: .6; .31; .03; .02 INJECTION, SOLUTION INTRAVENOUS at 13:30

## 2025-05-07 RX ADMIN — PROPOFOL 200 MG: 10 INJECTION, EMULSION INTRAVENOUS at 13:05

## 2025-05-07 RX ADMIN — Medication 100 MG: at 13:05

## 2025-05-07 RX ADMIN — FENTANYL CITRATE 100 MCG: 50 INJECTION, SOLUTION INTRAMUSCULAR; INTRAVENOUS at 13:10

## 2025-05-07 RX ADMIN — SODIUM CHLORIDE, SODIUM LACTATE, POTASSIUM CHLORIDE, AND CALCIUM CHLORIDE 125 ML/HR: .6; .31; .03; .02 INJECTION, SOLUTION INTRAVENOUS at 14:57

## 2025-05-07 RX ADMIN — ONDANSETRON 4 MG: 2 INJECTION, SOLUTION INTRAMUSCULAR; INTRAVENOUS at 13:17

## 2025-05-07 RX ADMIN — Medication 125 MILLI-UNITS/MIN: at 14:56

## 2025-05-07 RX ADMIN — MORPHINE SULFATE 5 MG: 0.5 INJECTION EPIDURAL; INTRATHECAL; INTRAVENOUS at 13:10

## 2025-05-07 RX ADMIN — DOCUSATE SODIUM 100 MG: 100 CAPSULE, LIQUID FILLED ORAL at 18:28

## 2025-05-07 RX ADMIN — MIDAZOLAM 2 MG: 1 INJECTION INTRAMUSCULAR; INTRAVENOUS at 13:10

## 2025-05-07 RX ADMIN — ESCITALOPRAM OXALATE 10 MG: 10 TABLET ORAL at 23:04

## 2025-05-07 RX ADMIN — KETOROLAC TROMETHAMINE 30 MG: 30 INJECTION, SOLUTION INTRAMUSCULAR; INTRAVENOUS at 13:30

## 2025-05-07 RX ADMIN — HYDROMORPHONE HYDROCHLORIDE 0.5 MG: 1 INJECTION, SOLUTION INTRAMUSCULAR; INTRAVENOUS; SUBCUTANEOUS at 13:50

## 2025-05-07 RX ADMIN — HYDROMORPHONE HYDROCHLORIDE: 10 INJECTION, SOLUTION INTRAMUSCULAR; INTRAVENOUS; SUBCUTANEOUS at 14:33

## 2025-05-07 RX ADMIN — Medication 250 MILLI-UNITS/MIN: at 13:10

## 2025-05-07 RX ADMIN — SODIUM CHLORIDE, SODIUM LACTATE, POTASSIUM CHLORIDE, AND CALCIUM CHLORIDE: .6; .31; .03; .02 INJECTION, SOLUTION INTRAVENOUS at 12:57

## 2025-05-07 RX ADMIN — TRANEXAMIC ACID 1000 MG: 10 INJECTION, SOLUTION INTRAVENOUS at 13:15

## 2025-05-07 RX ADMIN — CEFAZOLIN 2000 MG: 1 INJECTION, POWDER, FOR SOLUTION INTRAMUSCULAR; INTRAVENOUS at 13:07

## 2025-05-07 NOTE — ANESTHESIA POSTPROCEDURE EVALUATION
Post-Op Assessment Note    CV Status:  Stable  Pain Score: 0    Pain management: adequate       Mental Status:  Alert and awake   Hydration Status:  Euvolemic   PONV Controlled:  Controlled   Airway Patency:  Patent     Post Op Vitals Reviewed: Yes    No anethesia notable event occurred.    Staff: CRNA       Last Filed PACU Vitals:  Vitals Value Taken Time   Temp 98.2 °F (36.8 °C) 05/07/25 1355   Pulse 72 05/07/25 1355   /89 05/07/25 1355   Resp 16 05/07/25 1355   SpO2 100 % 05/07/25 1355       Modified Ben:     Vitals Value Taken Time   Activity 2 05/07/25 1355   Respiration 2 05/07/25 1355   Circulation 2 05/07/25 1355   Consciousness 2 05/07/25 1355   Oxygen Saturation 2 05/07/25 1355     Modified Ben Score: 10

## 2025-05-07 NOTE — OP NOTE
Section Operative Report - OB/GYN   Fuad Dudley 39 y.o. female MRN: [id number]  Unit/Bed#: LD PACU-03 Encounter: [id number]    Indications:  Category III FHT    Pre-operative Diagnosis:   38w5d  Category III FHT    Post-operative Diagnosis: same, delivered, oligohydramnios, meconium stained fluid    Procedure: STAT primary LTCS    Anethesia: General anethesia    Surgeon:  Bhavana Jones MD    Assistant(s): Trista Pike DO  No qualified surgical resident was available to assist in the case.  The assistance of an additional surgeon was critical for completion of the case. The assistant surgeon provided assistance with exposure, hemostasis, delivery of the infant, tissue manipulation, and suturing. The assistant surgeon was present from the start of the procedure until fascial closure. I, the primary surgeon, was present and scrubbed throughout the entirety of the case and performed all key portions of the surgical procedure.    Son Group Classification System:  No Multiple pregnancy, No Transverse or oblique lie, No Breech lie, Gestational age is > or =37 weeks, Multiparous, No Previous uterine scar, Spontaneous Labor +  is SON GROUP 3    Findings:  1. Delivery of viable male on 25 at 13:09, weight 6lbs 5oz;  Apgar scores of 7 at one minute and 9 at five minutes.   2. Normal appearing placenta with centrally-inserted 3 vessel cord  3. Meconium stained scant amniotic fluid      Specimens:   1. Arterial and venous cord gases  2. Cord blood  3. Placenta to pathology      Estimated Blood Loss:  214 cc           Total IV Fluids: 1000 cc    Drains: Lees catheter, draining clear yellow urine, 100 cc           Complications:  None; patient tolerated the procedure well.           Disposition:  PACU          Condition: stable    Procedure Details   Decision was made to proceed with  section due to Category III FHT. Patient was made aware of these findings and the proposed  plan. Risks, benefits, possible complications, alternate treatment options, and expected outcomes were discussed with the patient.  The patient agreed with the proposed plan and gave informed consent.      The patient was taken to the operating room where she was properly identified to the OR staff and attending physician.   A Lees catheter was aseptically inserted.  The abdomen was prepped with Betadine and the patient was draped in the usual sterile manner for a Pfannenstiel incision.  The patient had received Ancef 2g IV pre-operatively for prophylaxis.   Due to STAT nature patient was identified and procedure confirmed before she underwent general anesthesia.      A Pfannenstiel incision was made and carried down through the underlying subcutaneous tissue to the fascia using a scalpel. Rectus fascia was then incised in the midline and extended bluntly. The inferior and superior edges of the fascia were grasped bluntly and the underlying muscle was dissected with a blunt  dissection.   The rectus muscles were  in the midline and the peritoneum was identified and subsequently entered and extended longitudinally with blunt dissection.  The bladder blade was inserted.      A low transverse uterine incision was made with the scalpel and extended laterally with blunt dissection. The amnion was ruptured.  The surgeons hand was inserted through the hysterotomy and attempt made to deliver the head.  Fundal pressure was applied but fetal head did not deliver, Kiwi vacuum was applied for assistance with pressure to green range for 30 seconds prior to pop off and unsuccessful delivery.  At this point the right rectus muscle was cut approximately 3cm to make more room and after delivery unsuccessful with fundal pressure, vacuum was applied again.  Pressure was increased to green range for <30 seconds and the fetal head delivered through the uterine incision with the assistance of fundal pressure.  The umbilical  cord was clamped and cut immediately without delayed clamping due to STAT and GETA.  The infant was handed off to the  providers.  Arterial and venous cord gases and cord blood were obtained for evaluation and promptly sent to the lab.  The placenta delivered spontaneously with uterine fundal massage and was noted to have a centrally inserted 3 vessel cord.  This was also sent to the lab for pathology.    The uterus was unable to be exteriorized and an Star retractor was placed in the abdomen for retraction.  A lap sponge was used to clear the cavity of clots and products of conception. The uterine incision was closed with a running locked suture of 0 Vicryl. A second layer of the same suture was used to imbricate the first.  Good hemostasis was confirmed upon uterine closure.  Due to thick meconium, irrigation was used in abdomen and suctioned.  The paracolic gutters were cleared of all clots and debris with moist lap sponges.  The uterine incision was examined again and hemostatic.  The fascia was closed with a running suture of 0 Vicryl.  The subcutaneous tissue was irrigated and reapproximated with 2-0 Plain Gut suture. The skin was closed with Insorb absorbable staples and the incision dressed with steri-strips.  Mepilex Ag dressing was applied.     At the conclusion of the procedure, all needle, sponge, and instrument counts were noted to be correct x3.  The patient tolerated the procedure well and was transferred to the PACU in stable condition.     Patient was awakened from anesthesia after procedure.  In PACU I reviewed events with patient's  and need for .  After patient was fully awake, reviewed events with her as well and all questions answered.    Bhavana Jones MD

## 2025-05-07 NOTE — ANESTHESIA PREPROCEDURE EVALUATION
Procedure:   SECTION () (Uterus)    Relevant Problems   CARDIO   (+) Intractable menstrual migraine without status migrainosus      GYN   (+) Multigravida of advanced maternal age in third trimester      NEURO/PSYCH   (+) Depression affecting pregnancy, antepartum   (+) Intractable menstrual migraine without status migrainosus   (+) Moderate major depression, single episode (HCC)      O + hisotyr - awaiting  Physical Exam    Airway    Mallampati score: III  TM Distance: >3 FB  Neck ROM: full     Dental   Comment: None loose, No notable dental hx     Cardiovascular      Pulmonary      Other Findings  post-pubertal.         Latest Reference Range & Units 25 12:40   WBC 4.31 - 10.16 Thousand/uL 9.37   RBC 3.81 - 5.12 Million/uL 4.04   Hemoglobin 11.5 - 15.4 g/dL 11.3 (L)   Hematocrit 34.8 - 46.1 % 34.5 (L)   MCV 82 - 98 fL 85   MCH 26.8 - 34.3 pg 28.0   MCHC 31.4 - 37.4 g/dL 32.8   RDW 11.6 - 15.1 % 14.7   Platelet Count 149 - 390 Thousands/uL 215   MPV 8.9 - 12.7 fL 11.1   nRBC /100 WBCs 0   (L): Data is abnormally low        Anesthesia Plan  ASA Score- 2     Anesthesia Type- spinal with ASA Monitors.         Additional Monitors:     Airway Plan:     Comment: 09:00-  chips  Iced tea about 2 hours ago    Patient aware that general anesthesia is the backup plan in the case of an emergency.       Plan Factors-Exercise tolerance (METS): >4 METS.    Chart reviewed.   Existing labs reviewed. Patient summary reviewed.    Patient is not a current smoker.              Induction- intravenous.    Postoperative Plan- Plan for postoperative opioid use.     Perioperative Resuscitation Plan - Level 1 - Full Code.       Informed Consent- Anesthetic plan and risks discussed with patient.  I personally reviewed this patient with the CRNA. Discussed and agreed on the Anesthesia Plan with the CRNA..      NPO Status:  No vitals data found for the desired time range.

## 2025-05-07 NOTE — TELEPHONE ENCOUNTER
"FOLLOW UP: Go to [facility].  ESC to on call provider and L&D Charge RN.    REASON FOR CONVERSATION: Contractions    SYMPTOMS: 38w5d,  contractions since 030    OTHER: Currently 2-3 mins apart, lasting 1-2 mins.  Denies VB/LOF.  Not much FM but normally isn't active in the morning.      DISPOSITION: Go to [facility]      Reason for Disposition   History of prior delivery (multipara) with contractions < 10 minutes apart, and present 1 hour    Answer Assessment - Initial Assessment Questions  1. ONSET: \"When did the symptoms begin?\"         030  2. CONTRACTIONS: \"Describe the contractions that you are having.\" (e.g., duration, frequency, regularity, severity)      2-3 mins apart, lasting 1-2 mins, regular, \"takes breath away\"  3. PREGNANCY: \"How many weeks pregnant are you?\"      38w5d  4. GOOD: \"What date are you expecting to deliver?\"      25  5. PARITY: \"Have you had a baby before?\" If Yes, ask: \"How long did the labor last?\"      Yes but was induced with all  6. FETAL MOVEMENT: \"Has the baby's movement decreased or changed significantly from normal?\"      Not much but doesn't isn't normally very active in the morning.  7. OTHER SYMPTOMS: \"Do you have any other symptoms?\" (e.g., leaking fluid from vagina, vaginal bleeding, fever, hand/facial swelling)      denies    Protocols used: Pregnancy - Labor-Adult-[place]    "

## 2025-05-07 NOTE — H&P
OB H&P  Fuad Dudley  1985  LD TRIAGE LD TRIAGE 1*          39 y.o. yo  at 38 weeks and 5 days by us and lmp      Assessment:/Plan:     IUP at 38 weeks and 5 days.   GBS positive  AMA  Class II Obesity  Non-reassuring FHTs  Decreased fetal movement      Upon arrival placed on fetal monitor, bradycardic episodes and  late decels noted.  Remote from delivery.  Proceed to C/S with Dr. Jones and Dr. Montgomery.      Chief complaint:  Contractions that started around 0300    HPI:  Contractions:  yes  Fetal movement:  yes  Vaginal bleeding:   no  Leaking of fluid:  no      Pregnancy Complications:  Patient Active Problem List   Diagnosis    Obesity affecting pregnancy, antepartum    Moderate major depression, single episode (HCC)    Intractable menstrual migraine without status migrainosus    Multigravida of advanced maternal age in third trimester    Depression affecting pregnancy, antepartum    37 weeks gestation of pregnancy    Skin yeast infection    Abnormal glucose affecting pregnancy    Uterine contractions    GBS (group B Streptococcus carrier), +RV culture, currently pregnant         Past Medical History:  Past Medical History:   Diagnosis Date    Depression     Migraine     Menstrual cycle related-treated with Imitrex as needed.       Past Surgical History:  Past Surgical History:   Procedure Laterality Date    APPENDECTOMY      WISDOM TOOTH EXTRACTION Bilateral        Social Hx:  Social History     Socioeconomic History    Marital status: /Civil Union     Spouse name: Ventura    Number of children: 4    Years of education: Not on file    Highest education level: Not on file   Occupational History    Occupation:    Tobacco Use    Smoking status: Never    Smokeless tobacco: Never   Vaping Use    Vaping status: Never Used   Substance and Sexual Activity    Alcohol use: Never    Drug use: Never    Sexual activity: Yes     Partners: Male     Birth control/protection: None      Comment: no new partner in past year   Other Topics Concern    Not on file   Social History Narrative    Not on file     Social Drivers of Health     Financial Resource Strain: Not on file   Food Insecurity: No Food Insecurity (10/17/2024)    Nursing - Inadequate Food Risk Classification     Worried About Running Out of Food in the Last Year: Never true     Ran Out of Food in the Last Year: Never true     Ran Out of Food in the Last Year: Not on file   Transportation Needs: No Transportation Needs (10/17/2024)    PRAPARE - Transportation     Lack of Transportation (Medical): No     Lack of Transportation (Non-Medical): No   Physical Activity: Not on file   Stress: Not on file   Social Connections: Not on file   Intimate Partner Violence: Not on file   Housing Stability: Low Risk  (10/17/2024)    Housing Stability Vital Sign     Unable to Pay for Housing in the Last Year: No     Number of Times Moved in the Last Year: 0     Homeless in the Last Year: No       Meds:  No current facility-administered medications on file prior to encounter.     Current Outpatient Medications on File Prior to Encounter   Medication Sig Dispense Refill    escitalopram (LEXAPRO) 10 mg tablet Take 1 tablet (10 mg total) by mouth daily 90 tablet 2    Prenatal MV-Min-Fe Fum-FA-DHA (PRENATAL+DHA PO) Take by mouth      nystatin (MYCOSTATIN) powder Apply topically 3 (three) times a day 60 g 1       Allergies:  No Known Allergies    RoS/    Constitutional:       General: She is not in acute distress.     Appearance: Normal appearance.   HENT:      Head: Normocephalic.      Nose: Nose normal.   Eyes:      General: No scleral icterus.  Cardiovascular:      Pulses: Normal pulses.   Pulmonary:      Effort: Pulmonary effort is normal. No respiratory distress.      Breath sounds: No wheezing.   Abdominal:      General: Bowel sounds are normal. There is no distension.      Palpations: Abdomen is soft.      Tenderness: There is no abdominal tenderness.  "There is no guarding.   Musculoskeletal:      Cervical back: Neck supple.      Right lower leg: No edema.      Left lower leg: No edema.   Skin:     General: Skin is warm.      Capillary Refill: Capillary refill takes less than 2 seconds.   Neurological:      Mental Status: She is alert and oriented to person, place, and time.   Psychiatric:         Mood and Affect: Mood normal.               Obstetric History:        Labs:  Strep Grp B SHERINE   Date Value Ref Range Status   2025 Positive (A) Negative Final     Comment:     Centers for Disease Control and Prevention (CDC) and American Congress  of Obstetricians and Gynecologists (ACOG) guidelines for prevention of   group B streptococcal (GBS) disease specify co-collection of  a vaginal and rectal swab specimen to maximize sensitivity of GBS  detection. Per the CDC and ACOG, swabbing both the lower vagina and  rectum substantially increases the yield of detection compared with  sampling the vagina alone.  Penicillin G, ampicillin, or cefazolin are indicated for intrapartum  prophylaxis of  GBS colonization. Reflex susceptibility  testing should be performed prior to use of clindamycin only on GBS  isolates from penicillin-allergic women who are considered a high risk  for anaphylaxis. Treatment with vancomycin without additional testing  is warranted if resistance to clindamycin is noted.       Type & Screen:  ABO Grouping   Date Value Ref Range Status   2024 O  Final      Rh Type   Date Value Ref Range Status   2024 Positive  Final     Comment:     Please note: Prior records for this patient's ABO / Rh type are not  available for additional verification.      No results found for: \"ANTIBODYSCR\"  No results found for: \"SPECIMEXP\"  HIV-1/HIV-2 AB   Date Value Ref Range Status   2024 Non-Reactive  Final     Hepatitis B Surface Ag   Date Value Ref Range Status   2024 negative  Final     RPR   Date Value Ref Range " "Status   03/14/2025 Non Reactive Non Reactive Final     No results found for: \"RUBELLAIGGQT\"  Glucose   Date Value Ref Range Status   12/06/2024 161 (H) 70 - 139 mg/dL Final     Comment:     According to ADA, a glucose threshold of >139 mg/dL after 50-gram  load identifies approximately 80% of women with gestational  diabetes mellitus, while the sensitivity is further increased to  approximately 90% by a threshold of >129 mg/dL.               Physical Exam:      Vital signs:  98.2  128/64, 18     Alert, comfortable, no acute distress      Neuro:        Alert, appropriate affect, no gross deficits        Normal speech        CN2-12 intact and symmetric        DTR 3+ and symmetric        Muscle strength 5/5 and symmetric    Regular rate and rhythm    Clear to auscultation bilaterally    Abdomen soft, nontender, nondistended.    Fundus nontender, size consistent with dates      Cephalic  Cervix: 3 cm, 60%, -3, vtx  FHR: cat 3  CTXN: irregular  Membranes: intact  Est. Fetal Weight: 7 lbs  Pelvis adequate  Fetal Position: vtx        "

## 2025-05-07 NOTE — PLAN OF CARE
Problem: PAIN - ADULT  Goal: Verbalizes/displays adequate comfort level or baseline comfort level  Description: Interventions:- Encourage patient to monitor pain and request assistance- Assess pain using appropriate pain scale- Administer analgesics based on type and severity of pain and evaluate response- Implement non-pharmacological measures as appropriate and evaluate response- Consider cultural and social influences on pain and pain management- Notify physician/advanced practitioner if interventions unsuccessful or patient reports new pain  Outcome: Progressing     Problem: INFECTION - ADULT  Goal: Absence or prevention of progression during hospitalization  Description: INTERVENTIONS:- Assess and monitor for signs and symptoms of infection- Monitor lab/diagnostic results- Monitor all insertion sites, i.e. indwelling lines, tubes, and drains- Monitor endotracheal if appropriate and nasal secretions for changes in amount and color- [place] appropriate cooling/warming therapies per order- Administer medications as ordered- Instruct and encourage patient and family to use good hand hygiene technique- Identify and instruct in appropriate isolation precautions for identified infection/condition  Outcome: Progressing     Problem: SAFETY ADULT  Goal: Patient will remain free of falls  Description: INTERVENTIONS:- Educate patient/family on patient safety including physical limitations- Instruct patient to call for assistance with activity - Consult OT/PT to assist with strengthening/mobility - Keep Call bell within reach- Keep bed low and locked with side rails adjusted as appropriate  Outcome: Progressing     Problem: Knowledge Deficit  Goal: Patient/family/caregiver demonstrates understanding of disease process, treatment plan, medications, and discharge instructions  Description: Complete learning assessment and assess knowledge base.Interventions:- Provide teaching at level of understanding- Provide teaching  via preferred learning methods  Outcome: Progressing     Problem: DISCHARGE PLANNING  Goal: Discharge to home or other facility with appropriate resources  Description: INTERVENTIONS:- Identify barriers to discharge w/patient and caregiver- Arrange for needed discharge resources and transportation as appropriate- Identify discharge learning needs (meds, wound care, etc.)- Arrange for interpretive services to assist at discharge as needed- Refer to Case Management Department for coordinating discharge planning if the patient needs post-hospital services based on physician/advanced practitioner order or complex needs related to functional status, cognitive ability, or social support system  Outcome: Progressing

## 2025-05-08 PROBLEM — Z98.891 STATUS POST PRIMARY LOW TRANSVERSE CESAREAN SECTION: Status: ACTIVE | Noted: 2025-05-08

## 2025-05-08 PROBLEM — R03.0 ELEVATED BP WITHOUT DIAGNOSIS OF HYPERTENSION: Status: ACTIVE | Noted: 2025-05-08

## 2025-05-08 LAB
ERYTHROCYTE [DISTWIDTH] IN BLOOD BY AUTOMATED COUNT: 14.8 % (ref 11.6–15.1)
HCT VFR BLD AUTO: 30.7 % (ref 34.8–46.1)
HGB BLD-MCNC: 9.8 G/DL (ref 11.5–15.4)
MCH RBC QN AUTO: 27.6 PG (ref 26.8–34.3)
MCHC RBC AUTO-ENTMCNC: 31.9 G/DL (ref 31.4–37.4)
MCV RBC AUTO: 87 FL (ref 82–98)
PLATELET # BLD AUTO: 206 THOUSANDS/UL (ref 149–390)
PMV BLD AUTO: 11.2 FL (ref 8.9–12.7)
RBC # BLD AUTO: 3.55 MILLION/UL (ref 3.81–5.12)
TREPONEMA PALLIDUM IGG+IGM AB [PRESENCE] IN SERUM OR PLASMA BY IMMUNOASSAY: NORMAL
WBC # BLD AUTO: 11.36 THOUSAND/UL (ref 4.31–10.16)

## 2025-05-08 PROCEDURE — 85027 COMPLETE CBC AUTOMATED: CPT | Performed by: OBSTETRICS & GYNECOLOGY

## 2025-05-08 PROCEDURE — 99024 POSTOP FOLLOW-UP VISIT: CPT | Performed by: OBSTETRICS & GYNECOLOGY

## 2025-05-08 RX ADMIN — IBUPROFEN 600 MG: 600 TABLET ORAL at 21:07

## 2025-05-08 RX ADMIN — IBUPROFEN 600 MG: 600 TABLET ORAL at 13:37

## 2025-05-08 RX ADMIN — ACETAMINOPHEN 650 MG: 325 TABLET, FILM COATED ORAL at 09:42

## 2025-05-08 RX ADMIN — ACETAMINOPHEN 650 MG: 325 TABLET, FILM COATED ORAL at 17:12

## 2025-05-08 RX ADMIN — DOCUSATE SODIUM 100 MG: 100 CAPSULE, LIQUID FILLED ORAL at 17:12

## 2025-05-08 RX ADMIN — ESCITALOPRAM OXALATE 10 MG: 10 TABLET ORAL at 21:56

## 2025-05-08 RX ADMIN — DOCUSATE SODIUM 100 MG: 100 CAPSULE, LIQUID FILLED ORAL at 09:42

## 2025-05-08 NOTE — ASSESSMENT & PLAN NOTE
A/P.  39 y.o.  POD#1 s/p , Low Transverse at 38w5d of 2863 g (6 lb 5 oz) male , apgars 7 /9 .  (1) POD#1.  Delivered 2025  1:09 PM.  --> Routine postoperative care.

## 2025-05-08 NOTE — PLAN OF CARE
Problem: PAIN - ADULT  Goal: Verbalizes/displays adequate comfort level or baseline comfort level  Description: Interventions:- Encourage patient to monitor pain and request assistance- Assess pain using appropriate pain scale- Administer analgesics based on type and severity of pain and evaluate response- Implement non-pharmacological measures as appropriate and evaluate response- Consider cultural and social influences on pain and pain management- Notify physician/advanced practitioner if interventions unsuccessful or patient reports new pain  Outcome: Progressing     Problem: INFECTION - ADULT  Goal: Absence or prevention of progression during hospitalization  Description: INTERVENTIONS:- Assess and monitor for signs and symptoms of infection- Monitor lab/diagnostic results- Monitor all insertion sites, i.e. indwelling lines, tubes, and drains- Monitor endotracheal if appropriate and nasal secretions for changes in amount and color- [place] appropriate cooling/warming therapies per order- Administer medications as ordered- Instruct and encourage patient and family to use good hand hygiene technique- Identify and instruct in appropriate isolation precautions for identified infection/condition  Outcome: Progressing     Problem: SAFETY ADULT  Goal: Patient will remain free of falls  Description: INTERVENTIONS:- Educate patient/family on patient safety including physical limitations- Instruct patient to call for assistance with activity - Consult OT/PT to assist with strengthening/mobility - Keep Call bell within reach- Keep bed low and locked with side rails adjusted as appropriate- Keep care items and personal belongings within reach- Initiate and maintain comfort rounds- Make Fall Risk Sign visible to staff- Offer Toileting every ad serafin Hours, in advance of need- Initiate/Maintain ad serafin alarm- Obtain necessary fall risk management equipment: adkib- Apply yellow socks and bracelet for high fall risk patients-  Consider moving patient to room near nurses station  Outcome: Progressing     Problem: Knowledge Deficit  Goal: Patient/family/caregiver demonstrates understanding of disease process, treatment plan, medications, and discharge instructions  Description: Complete learning assessment and assess knowledge base.Interventions:- Provide teaching at level of understanding- Provide teaching via preferred learning methods  Outcome: Progressing     Problem: DISCHARGE PLANNING  Goal: Discharge to home or other facility with appropriate resources  Description: INTERVENTIONS:- Identify barriers to discharge w/patient and caregiver- Arrange for needed discharge resources and transportation as appropriate- Identify discharge learning needs (meds, wound care, etc.)- Arrange for interpretive services to assist at discharge as needed- Refer to Case Management Department for coordinating discharge planning if the patient needs post-hospital services based on physician/advanced practitioner order or complex needs related to functional status, cognitive ability, or social support system  Outcome: Progressing

## 2025-05-08 NOTE — PROGRESS NOTES
"Progress Note - OB/GYN   Name: Fuad Dudley 39 y.o. female I MRN: [id number]  Unit/Bed#: -01 I Date of Admission: 2025   Date of Service: 2025 I Hospital Day: 1     Assessment & Plan  Uterine contractions    Status post primary low transverse  section  A/P.  39 y.o.  POD#1 s/p , Low Transverse at 38w5d of 2863 g (6 lb 5 oz) male , apgars 7 /9 .  (1) POD#1.  Delivered 2025  1:09 PM.  --> Routine postoperative care.  Obesity affecting pregnancy, antepartum  BMI 37  --> Enoxaparin  Elevated BP without diagnosis of hypertension  Isolated elevated BP 25 at 1455 of 141/67.  --> Follow BP/Sx.    Wilian Carey MD  25  ----------------------------------------------------------------------------------------------    Subjective/    Feels well.  Tolerating po, ambulating, voiding without difficulty.  Happy.  Bonding.  Pain controlled with oral medications.  (+)flatus.    Objective/  Blood pressure 114/64, pulse 69, temperature 98.4 °F (36.9 °C), temperature source Temporal, resp. rate 18, height 5' 6\" (1.676 m), weight 105 kg (232 lb), last menstrual period 2024, SpO2 97%, not currently breastfeeding.    Patient Vitals for the past 24 hrs:   BP Temp Temp src Pulse Resp SpO2 Height Weight   25 0719 114/64 98.4 °F (36.9 °C) Temporal 69 18 97 % -- --   25 0241 100/56 97.7 °F (36.5 °C) Temporal 57 18 98 % -- --   25 2232 110/62 98.2 °F (36.8 °C) Temporal 72 18 99 % -- --   25 1902 121/82 98.2 °F (36.8 °C) Temporal 83 18 99 % -- --   25 1815 123/80 -- -- 81 18 99 % -- --   25 1715 -- -- -- -- 18 99 % -- --   25 1657 -- -- -- -- -- -- 5' 6\" (1.676 m) 105 kg (232 lb)   25 1615 112/79 -- -- 67 18 99 % -- --   25 1530 132/69 -- -- 69 18 99 % -- --   25 1455 141/67 -- -- 65 16 99 % -- --   25 1425 136/58 -- -- 72 16 99 % -- --   25 1410 128/58 -- -- 58 16 100 % -- --   25 1355 134/89 98.2 " °F (36.8 °C) Oral 72 16 100 % -- --   05/07/25 1221 128/64 98.2 °F (36.8 °C) Temporal 78 18 -- -- --         Physical Exam/      General:  Alert, comfortable, NAD      Cardiovascular: Regular rate and rhythm      Respiratory: Clear to auscultation bilaterally.      Abdomen: Soft, non-tender, non-distended.  Dressing clean and dry.      Fundus: Firm, below umbilicus, nontender      Extremities: Warm, non-tender      Labs/      Blood type:  O+      Rubella: Immune      Lab Results   Component Value Date    WBC 11.36 (H) 05/08/2025    HGB 9.8 (L) 05/08/2025    HCT 30.7 (L) 05/08/2025    MCV 87 05/08/2025     05/08/2025

## 2025-05-08 NOTE — PLAN OF CARE
Problem: PAIN - ADULT  Goal: Verbalizes/displays adequate comfort level or baseline comfort level  Description: Interventions:- Encourage patient to monitor pain and request assistance- Assess pain using appropriate pain scale- Administer analgesics based on type and severity of pain and evaluate response- Implement non-pharmacological measures as appropriate and evaluate response- Consider cultural and social influences on pain and pain management- Notify physician/advanced practitioner if interventions unsuccessful or patient reports new pain  Outcome: Progressing     Problem: INFECTION - ADULT  Goal: Absence or prevention of progression during hospitalization  Description: INTERVENTIONS:- Assess and monitor for signs and symptoms of infection- Monitor lab/diagnostic results- Monitor all insertion sites, i.e. indwelling lines, tubes, and drains- Monitor endotracheal if appropriate and nasal secretions for changes in amount and color- [place] appropriate cooling/warming therapies per order- Administer medications as ordered- Instruct and encourage patient and family to use good hand hygiene technique- Identify and instruct in appropriate isolation precautions for identified infection/condition  Outcome: Progressing     Problem: SAFETY ADULT  Goal: Patient will remain free of falls  Description: INTERVENTIONS:- Educate patient/family on patient safety including physical limitations- Instruct patient to call for assistance with activity - Consult OT/PT to assist with strengthening/mobility - Keep Call bell within reach- Keep bed low and locked with side rails adjusted as appropriate- Keep care items and personal belongings within reach- Initiate and maintain comfort rounds- Make Fall Risk Sign visible to staff- Offer Toileting every  Hours, in advance of need- Initiate/Maintainalarm- Obtain necessary fall risk management equipment: - Apply yellow socks and bracelet for high fall risk patients- Consider moving  patient to room near nurses station  Outcome: Progressing     Problem: Knowledge Deficit  Goal: Patient/family/caregiver demonstrates understanding of disease process, treatment plan, medications, and discharge instructions  Description: Complete learning assessment and assess knowledge base.Interventions:- Provide teaching at level of understanding- Provide teaching via preferred learning methods  Outcome: Progressing     Problem: DISCHARGE PLANNING  Goal: Discharge to home or other facility with appropriate resources  Description: INTERVENTIONS:- Identify barriers to discharge w/patient and caregiver- Arrange for needed discharge resources and transportation as appropriate- Identify discharge learning needs (meds, wound care, etc.)- Arrange for interpretive services to assist at discharge as needed- Refer to Case Management Department for coordinating discharge planning if the patient needs post-hospital services based on physician/advanced practitioner order or complex needs related to functional status, cognitive ability, or social support system  Outcome: Progressing

## 2025-05-09 LAB
ALBUMIN SERPL BCG-MCNC: 2.8 G/DL (ref 3.5–5)
ALP SERPL-CCNC: 76 U/L (ref 34–104)
ALT SERPL W P-5'-P-CCNC: 16 U/L (ref 7–52)
ANION GAP SERPL CALCULATED.3IONS-SCNC: 5 MMOL/L (ref 4–13)
AST SERPL W P-5'-P-CCNC: 29 U/L (ref 13–39)
BILIRUB SERPL-MCNC: 0.19 MG/DL (ref 0.2–1)
BUN SERPL-MCNC: 9 MG/DL (ref 5–25)
CALCIUM ALBUM COR SERPL-MCNC: 9.1 MG/DL (ref 8.3–10.1)
CALCIUM SERPL-MCNC: 8.1 MG/DL (ref 8.4–10.2)
CHLORIDE SERPL-SCNC: 104 MMOL/L (ref 96–108)
CO2 SERPL-SCNC: 27 MMOL/L (ref 21–32)
CREAT SERPL-MCNC: 0.69 MG/DL (ref 0.6–1.3)
GFR SERPL CREATININE-BSD FRML MDRD: 111 ML/MIN/1.73SQ M
GLUCOSE SERPL-MCNC: 95 MG/DL (ref 65–140)
POTASSIUM SERPL-SCNC: 4 MMOL/L (ref 3.5–5.3)
PROT SERPL-MCNC: 5.5 G/DL (ref 6.4–8.4)
SODIUM SERPL-SCNC: 136 MMOL/L (ref 135–147)

## 2025-05-09 PROCEDURE — 80053 COMPREHEN METABOLIC PANEL: CPT | Performed by: STUDENT IN AN ORGANIZED HEALTH CARE EDUCATION/TRAINING PROGRAM

## 2025-05-09 PROCEDURE — 99024 POSTOP FOLLOW-UP VISIT: CPT | Performed by: STUDENT IN AN ORGANIZED HEALTH CARE EDUCATION/TRAINING PROGRAM

## 2025-05-09 RX ORDER — ACETAMINOPHEN 325 MG/1
650 TABLET ORAL EVERY 6 HOURS SCHEDULED
Start: 2025-05-09

## 2025-05-09 RX ORDER — ENOXAPARIN SODIUM 100 MG/ML
40 INJECTION SUBCUTANEOUS
Status: DISCONTINUED | OUTPATIENT
Start: 2025-05-09 | End: 2025-05-11 | Stop reason: HOSPADM

## 2025-05-09 RX ORDER — IBUPROFEN 200 MG
600 TABLET ORAL EVERY 6 HOURS PRN
Start: 2025-05-09

## 2025-05-09 RX ORDER — DOCUSATE SODIUM 100 MG/1
100 CAPSULE, LIQUID FILLED ORAL 2 TIMES DAILY PRN
Start: 2025-05-09

## 2025-05-09 RX ADMIN — ACETAMINOPHEN 650 MG: 325 TABLET, FILM COATED ORAL at 09:49

## 2025-05-09 RX ADMIN — ACETAMINOPHEN 650 MG: 325 TABLET, FILM COATED ORAL at 16:02

## 2025-05-09 RX ADMIN — ESCITALOPRAM OXALATE 10 MG: 10 TABLET ORAL at 22:06

## 2025-05-09 RX ADMIN — DOCUSATE SODIUM 100 MG: 100 CAPSULE, LIQUID FILLED ORAL at 18:24

## 2025-05-09 RX ADMIN — DOCUSATE SODIUM 100 MG: 100 CAPSULE, LIQUID FILLED ORAL at 09:49

## 2025-05-09 RX ADMIN — IBUPROFEN 600 MG: 600 TABLET ORAL at 04:11

## 2025-05-09 RX ADMIN — ACETAMINOPHEN 650 MG: 325 TABLET, FILM COATED ORAL at 22:06

## 2025-05-09 RX ADMIN — IBUPROFEN 600 MG: 600 TABLET ORAL at 15:20

## 2025-05-09 RX ADMIN — ENOXAPARIN SODIUM 40 MG: 40 INJECTION SUBCUTANEOUS at 15:21

## 2025-05-09 RX ADMIN — ACETAMINOPHEN 650 MG: 325 TABLET, FILM COATED ORAL at 00:30

## 2025-05-09 NOTE — PLAN OF CARE
Problem: PAIN - ADULT  Goal: Verbalizes/displays adequate comfort level or baseline comfort level  Description: Interventions:- Encourage patient to monitor pain and request assistance- Assess pain using appropriate pain scale- Administer analgesics based on type and severity of pain and evaluate response- Implement non-pharmacological measures as appropriate and evaluate response- Consider cultural and social influences on pain and pain management- Notify physician/advanced practitioner if interventions unsuccessful or patient reports new pain  Outcome: Progressing     Problem: INFECTION - ADULT  Goal: Absence or prevention of progression during hospitalization  Description: INTERVENTIONS:- Assess and monitor for signs and symptoms of infection- Monitor lab/diagnostic results- Monitor all insertion sites, i.e. indwelling lines, tubes, and drains- Monitor endotracheal if appropriate and nasal secretions for changes in amount and color- [place] appropriate cooling/warming therapies per order- Administer medications as ordered- Instruct and encourage patient and family to use good hand hygiene technique- Identify and instruct in appropriate isolation precautions for identified infection/condition  Outcome: Progressing     Problem: SAFETY ADULT  Goal: Patient will remain free of falls  Description: INTERVENTIONS:- Educate patient/family on patient safety including physical limitations- Instruct patient to call for assistance with activity - Consult OT/PT to assist with strengthening/mobility - Keep Call bell within reach- Keep bed low and locked with side rails adjusted as appropriate- Keep care items and personal belongings within reach- Initiate and maintain comfort rounds- Make Fall Risk Sign visible to staff  Outcome: Progressing     Problem: Knowledge Deficit  Goal: Patient/family/caregiver demonstrates understanding of disease process, treatment plan, medications, and discharge instructions  Description:  Complete learning assessment and assess knowledge base.Interventions:- Provide teaching at level of understanding- Provide teaching via preferred learning methods  Outcome: Progressing     Problem: DISCHARGE PLANNING  Goal: Discharge to home or other facility with appropriate resources  Description: INTERVENTIONS:- Identify barriers to discharge w/patient and caregiver- Arrange for needed discharge resources and transportation as appropriate- Identify discharge learning needs (meds, wound care, etc.)- Arrange for interpretive services to assist at discharge as needed- Refer to Case Management Department for coordinating discharge planning if the patient needs post-hospital services based on physician/advanced practitioner order or complex needs related to functional status, cognitive ability, or social support system  Outcome: Progressing

## 2025-05-09 NOTE — PROGRESS NOTES
Progress Note - OB/GYN   Name: Fuad Dudley 39 y.o. female I MRN: [id number]  Unit/Bed#: -01 I Date of Admission: 2025   Date of Service: 2025 I Hospital Day: 2    Assessment & Plan  Status post primary low transverse  section  - Meeting all post-op/postpartum milestones. Continue routine postoperative care.  Obesity affecting pregnancy, antepartum  BMI 37  - Continue Lovenox    OB Post-Partum Progress Note  Subjective   Post delivery. Patient is doing well. Lochia WNL. Pain well controlled.     Pain: yes, cramping, improved with meds  Tolerating PO: yes  Voiding: Yes  Flatus: Yes  Ambulating: yes  Chest pain: no  Shortness of breath: no  Leg pain: no  Lochia: WNL    Objective :  Temp:  [97.5 °F (36.4 °C)-98.2 °F (36.8 °C)] 98.1 °F (36.7 °C)  HR:  [62-82] 66  BP: ()/(48-79) 123/79  Resp:  [16-18] 18  SpO2:  [98 %-99 %] 98 %  O2 Device: None (Room air)    Physical Exam  Vitals reviewed.   Constitutional:       General: She is not in acute distress.     Appearance: Normal appearance. She is not ill-appearing.   Cardiovascular:      Rate and Rhythm: Normal rate.   Pulmonary:      Effort: Pulmonary effort is normal.   Abdominal:      General: There is no distension.      Palpations: Abdomen is soft.      Tenderness: There is no guarding.      Comments: Mepilex dressing in place.    Musculoskeletal:         General: Normal range of motion.      Cervical back: Normal range of motion.   Skin:     General: Skin is warm and dry.   Neurological:      General: No focal deficit present.      Mental Status: She is alert and oriented to person, place, and time. Mental status is at baseline.   Psychiatric:         Mood and Affect: Mood normal.         Behavior: Behavior normal.         Thought Content: Thought content normal.         Judgment: Judgment normal.           Lab Results: I have reviewed the following results:  Lab Results   Component Value Date    WBC 11.36 (H) 2025    HGB 9.8 (L)  05/08/2025    HCT 30.7 (L) 05/08/2025    MCV 87 05/08/2025     05/08/2025

## 2025-05-09 NOTE — PLAN OF CARE
Problem: PAIN - ADULT  Goal: Verbalizes/displays adequate comfort level or baseline comfort level  Description: Interventions:- Encourage patient to monitor pain and request assistance- Assess pain using appropriate pain scale- Administer analgesics based on type and severity of pain and evaluate response- Implement non-pharmacological measures as appropriate and evaluate response- Consider cultural and social influences on pain and pain management- Notify physician/advanced practitioner if interventions unsuccessful or patient reports new pain  Outcome: Completed     Problem: INFECTION - ADULT  Goal: Absence or prevention of progression during hospitalization  Description: INTERVENTIONS:- Assess and monitor for signs and symptoms of infection- Monitor lab/diagnostic results- Monitor all insertion sites, i.e. indwelling lines, tubes, and drains- Monitor endotracheal if appropriate and nasal secretions for changes in amount and color- [place] appropriate cooling/warming therapies per order- Administer medications as ordered- Instruct and encourage patient and family to use good hand hygiene technique- Identify and instruct in appropriate isolation precautions for identified infection/condition  Outcome: Completed     Problem: SAFETY ADULT  Goal: Patient will remain free of falls  Description: INTERVENTIONS:- Educate patient/family on patient safety including physical limitations- Instruct patient to call for assistance with activity - Consult OT/PT to assist with strengthening/mobility - Keep Call bell within reach- Keep bed low and locked with side rails adjusted as appropriate- Keep care items and personal belongings within reach- Initiate and maintain comfort rounds-  Outcome: Completed     Problem: Knowledge Deficit  Goal: Patient/family/caregiver demonstrates understanding of disease process, treatment plan, medications, and discharge instructions  Description: Complete learning assessment and assess  knowledge base.Interventions:- Provide teaching at level of understanding- Provide teaching via preferred learning methods  Outcome: Completed     Problem: DISCHARGE PLANNING  Goal: Discharge to home or other facility with appropriate resources  Description: INTERVENTIONS:- Identify barriers to discharge w/patient and caregiver- Arrange for needed discharge resources and transportation as appropriate- Identify discharge learning needs (meds, wound care, etc.)- Arrange for interpretive services to assist at discharge as needed- Refer to Case Management Department for coordinating discharge planning if the patient needs post-hospital services based on physician/advanced practitioner order or complex needs related to functional status, cognitive ability, or social support system  Outcome: Completed

## 2025-05-09 NOTE — DISCHARGE SUMMARY
Discharge Summary - OB/GYN   Name: Fuad Dudley 39 y.o. female I MRN: [id number]  Unit/Bed#: -01 I Date of Admission: 2025   Date of Service: 2025 I Hospital Day: 2    ADMISSION  Patient of: [facility] [place]  Admission Date: 2025   Admitting Attending: Dr. Bhavana Jones MD  Admitting Diagnoses:   Patient Active Problem List   Diagnosis    Obesity affecting pregnancy, antepartum    Moderate major depression, single episode (HCC)    Intractable menstrual migraine without status migrainosus    Multigravida of advanced maternal age in third trimester    Depression affecting pregnancy, antepartum    37 weeks gestation of pregnancy    Skin yeast infection    Abnormal glucose affecting pregnancy    Uterine contractions    GBS (group B Streptococcus carrier), +RV culture, currently pregnant    Status post primary low transverse  section    Elevated BP without diagnosis of hypertension       DELIVERY  Delivery Method: , Low Transverse   Delivery Date and Time: 2025 1:09 PM  Delivery Attending: Bhavana Jones    DISCHARGE  Discharge Date: 2025  Discharge Attending: Dr. Zach Chen MD  Discharge Diagnosis:   Same, Delivered    Clinical course: Admission to Delivery  Fuad Dudley is a 39 y.o.  who was admitted at 38w5d for delivery in the setting of non-reassuring fetal testin.    Delivery  Route of Delivery: , Low Transverse  Reason for  delivery: Category III FHR Tracing      Anesthesia: General,   QBL: 214 mL    Delivery: , Low Transverse at 2025 1:09 PM  Baby's Weight: 2863 g (6 lb 5 oz); 101    Apgar scores: 7  and 9  at 1 and 5 minutes, respectively    Clinical Course: Post-Delivery:  The post delivery course was unremarkable.    On the day of discharge, the patient was ambulating, voiding spontaneously, tolerating oral intake, and hemodynamically stable. She was able to reasonably perform all ADLs. She had appropriate  bowel function. Pain was well-controlled. She was discharged home on postpartum/postop day #2 without complications. Patient was instructed to follow up with her OB as an outpatient and was given appropriate warnings to call her provider with problems or concerns.    Pertinent lab findings included:   Blood type O+.     Last three Hgb values:  Lab Results   Component Value Date    HGB 9.8 (L) 2025    HGB 11.3 (L) 2025    HGB 11.1 2025        Problem-specific follow-up plans included the following:  Assessment & Plan  Status post primary low transverse  section  - Meeting all post-op/postpartum milestones. Continue routine postoperative care.  - Stable for discharge to home today.   Obesity affecting pregnancy, antepartum  BMI 37  --> Enoxaparin      Discharge med list:     Medication List      START taking these medications     acetaminophen 325 mg tablet; Commonly known as: TYLENOL; Take 2 tablets   (650 mg total) by mouth every 6 (six) hours   docusate sodium 100 mg capsule; Commonly known as: COLACE; Take 1   capsule (100 mg total) by mouth 2 (two) times a day as needed for   constipation   ibuprofen 200 mg tablet; Commonly known as: MOTRIN; Take 3 tablets (600   mg total) by mouth every 6 (six) hours as needed for mild pain     CONTINUE taking these medications     escitalopram 10 mg tablet; Commonly known as: LEXAPRO; Take 1 tablet (10   mg total) by mouth daily   nystatin powder; Commonly known as: MYCOSTATIN; Apply topically 3   (three) times a day   PRENATAL+DHA PO       Condition at discharge:   good     Disposition:   Home    Planned Readmission:   No    Discharge Statement:  I have spent a total time of 15 minutes in caring for this patient on the day of the visit/encounter.

## 2025-05-09 NOTE — ASSESSMENT & PLAN NOTE
- Meeting all post-op/postpartum milestones. Continue routine postoperative care.  - Stable for discharge to home today.

## 2025-05-09 NOTE — ANESTHESIA POSTPROCEDURE EVALUATION
Post-Op Assessment Note    CV Status:  Stable  Pain Score: 0    Pain management: adequate       Mental Status:  Alert and awake   Hydration Status:  Stable   PONV Controlled:  None   Airway Patency:  Patent     Post Op Vitals Reviewed: Yes    No anethesia notable event occurred.    Staff: Anesthesiologist           Last Filed PACU Vitals:  Vitals Value Taken Time   Temp 98.2 °F (36.8 °C) 05/07/25 1355   Pulse 65 05/07/25 1455   /67 05/07/25 1455   Resp 16 05/07/25 1455   SpO2 99 % 05/07/25 1455       Modified Ben:     Vitals Value Taken Time   Activity 2 05/07/25 1355   Respiration 2 05/07/25 1355   Circulation 2 05/07/25 1355   Consciousness 2 05/07/25 1355   Oxygen Saturation 2 05/07/25 1355     Modified Ben Score: 10

## 2025-05-10 PROCEDURE — 99024 POSTOP FOLLOW-UP VISIT: CPT | Performed by: OBSTETRICS & GYNECOLOGY

## 2025-05-10 RX ADMIN — DOCUSATE SODIUM 100 MG: 100 CAPSULE, LIQUID FILLED ORAL at 09:52

## 2025-05-10 RX ADMIN — IBUPROFEN 600 MG: 600 TABLET ORAL at 18:29

## 2025-05-10 RX ADMIN — ACETAMINOPHEN 650 MG: 325 TABLET, FILM COATED ORAL at 22:14

## 2025-05-10 RX ADMIN — ACETAMINOPHEN 650 MG: 325 TABLET, FILM COATED ORAL at 10:00

## 2025-05-10 RX ADMIN — IBUPROFEN 600 MG: 600 TABLET ORAL at 22:14

## 2025-05-10 RX ADMIN — ENOXAPARIN SODIUM 40 MG: 40 INJECTION SUBCUTANEOUS at 09:52

## 2025-05-10 RX ADMIN — ACETAMINOPHEN 650 MG: 325 TABLET, FILM COATED ORAL at 18:29

## 2025-05-10 RX ADMIN — ACETAMINOPHEN 650 MG: 325 TABLET, FILM COATED ORAL at 05:02

## 2025-05-10 RX ADMIN — DOCUSATE SODIUM 100 MG: 100 CAPSULE, LIQUID FILLED ORAL at 18:29

## 2025-05-10 RX ADMIN — IBUPROFEN 600 MG: 600 TABLET ORAL at 05:01

## 2025-05-10 RX ADMIN — ESCITALOPRAM OXALATE 10 MG: 10 TABLET ORAL at 22:14

## 2025-05-10 RX ADMIN — IBUPROFEN 600 MG: 600 TABLET ORAL at 10:00

## 2025-05-10 NOTE — PLAN OF CARE
Problem: POSTPARTUM  Goal: Experiences normal postpartum course  Description: INTERVENTIONS:- Monitor maternal vital signs- Assess uterine involution and lochia  Outcome: Progressing  Goal: Appropriate maternal -  bonding  Description: INTERVENTIONS:- Identify family support- Assess for appropriate maternal/infant bonding -Encourage maternal/infant bonding opportunities- Referral to  or  as needed  Outcome: Progressing  Goal: Establishment of infant feeding pattern  Description: INTERVENTIONS:- Assess breast/bottle feeding- Refer to lactation as needed  Outcome: Progressing  Goal: Incision(s), wounds(s) or drain site(s) healing without S/S of infection  Description: INTERVENTIONS- Assess and document dressing, incision, wound bed, drain sites and surrounding tissue-   Outcome: Progressing

## 2025-05-10 NOTE — ASSESSMENT & PLAN NOTE
- Meeting all post-op/postpartum milestones. Continue routine postoperative care.  - home pending baby's bili

## 2025-05-10 NOTE — PROGRESS NOTES
Progress Note - OB/GYN   Name: Fuad Dudley 39 y.o. female I MRN: [id number]  Unit/Bed#: -01 I Date of Admission: 2025   Date of Service: 5/10/2025 I Hospital Day: 3    Assessment & Plan  Status post primary low transverse  section  - Meeting all post-op/postpartum milestones. Continue routine postoperative care.  - home pending baby's bili  Obesity affecting pregnancy, antepartum  BMI 37  - Continue Lovenox    OB Post-Partum Progress Note  Subjective   Post delivery. Patient is doing well. Lochia WNL. Pain well controlled. Baby still on bili lights.    Pain: yes, cramping, improved with meds  Tolerating PO: yes  Voiding: yes  Flatus: yes  BM: no  Ambulating: yes  Breastfeeding:  yes  Chest pain: no  Shortness of breath: no  Leg pain: no  Lochia: WNL    Objective :  Temp:  [96.9 °F (36.1 °C)-98.1 °F (36.7 °C)] 97.7 °F (36.5 °C)  HR:  [66-72] 72  BP: (110-142)/(55-82) 117/82  Resp:  [16-18] 18  SpO2:  [98 %-100 %] 99 %  O2 Device: None (Room air)    Physical Exam  Constitutional:       Appearance: Normal appearance.   HENT:      Head: Normocephalic.   Cardiovascular:      Rate and Rhythm: Normal rate and regular rhythm.   Pulmonary:      Effort: Pulmonary effort is normal.   Abdominal:      General: There is no distension.      Palpations: Abdomen is soft.      Tenderness: There is no abdominal tenderness.   Musculoskeletal:         General: No swelling.   Neurological:      General: No focal deficit present.      Mental Status: She is alert and oriented to person, place, and time.   Skin:     General: Skin is warm and dry.   Psychiatric:         Mood and Affect: Mood normal.         Behavior: Behavior normal.   Vitals and nursing note reviewed.            Lab Results: I have reviewed the following results:  Lab Results   Component Value Date    WBC 11.36 (H) 2025    HGB 9.8 (L) 2025    HCT 30.7 (L) 2025    MCV 87 2025     2025

## 2025-05-11 VITALS
BODY MASS INDEX: 37.28 KG/M2 | OXYGEN SATURATION: 98 % | TEMPERATURE: 97.8 F | RESPIRATION RATE: 18 BRPM | HEART RATE: 61 BPM | SYSTOLIC BLOOD PRESSURE: 126 MMHG | WEIGHT: 232 LBS | DIASTOLIC BLOOD PRESSURE: 66 MMHG | HEIGHT: 66 IN

## 2025-05-11 PROBLEM — O47.9 UTERINE CONTRACTIONS: Status: RESOLVED | Noted: 2025-04-22 | Resolved: 2025-05-11

## 2025-05-11 PROCEDURE — NC001 PR NO CHARGE: Performed by: OBSTETRICS & GYNECOLOGY

## 2025-05-11 PROCEDURE — 99024 POSTOP FOLLOW-UP VISIT: CPT | Performed by: OBSTETRICS & GYNECOLOGY

## 2025-05-11 RX ADMIN — IBUPROFEN 600 MG: 600 TABLET ORAL at 10:54

## 2025-05-11 RX ADMIN — DOCUSATE SODIUM 100 MG: 100 CAPSULE, LIQUID FILLED ORAL at 07:39

## 2025-05-11 RX ADMIN — ACETAMINOPHEN 650 MG: 325 TABLET, FILM COATED ORAL at 06:17

## 2025-05-11 RX ADMIN — IBUPROFEN 600 MG: 600 TABLET ORAL at 06:17

## 2025-05-11 RX ADMIN — ACETAMINOPHEN 650 MG: 325 TABLET, FILM COATED ORAL at 10:54

## 2025-05-11 RX ADMIN — ENOXAPARIN SODIUM 40 MG: 40 INJECTION SUBCUTANEOUS at 07:39

## 2025-05-11 NOTE — ASSESSMENT & PLAN NOTE
- sporadic mildly elevated BP, will need BP check in 1 week  - discussed checking BP ambulatory at home and instructions discussed  - Discussed reasons to call office after discharge, including but not limited to fever, shortness of breath, chest pain, severe headache, visual change, abdominal pain, heavy vaginal bleeding, persistent sadness or depression. Patient verbalized understanding.

## 2025-05-11 NOTE — DISCHARGE SUMMARY
ADMISSION  Patient of: [facility]  Admission Date: 2025   Admitting Attending: Dr. Bhavana Jones MD   Admitting Diagnoses:   Patient Active Problem List   Diagnosis    Obesity affecting pregnancy, antepartum    Moderate major depression, single episode (HCC)    Intractable menstrual migraine without status migrainosus    Multigravida of advanced maternal age in third trimester    Depression affecting pregnancy, antepartum    37 weeks gestation of pregnancy    Skin yeast infection    Abnormal glucose affecting pregnancy    GBS (group B Streptococcus carrier), +RV culture, currently pregnant    Status post primary low transverse  section    Elevated BP without diagnosis of hypertension       DELIVERY  Delivery Method: , Low Transverse   Delivery Date and Time: 2025 1:09 PM  Delivery Attending: Bhavana Jones     DISCHARGE  Discharge Date: 25  Discharge Attending: Dr. Bhavana Jones MD   Discharge Diagnosis:   Same, Delivered     Clinical course: Admission to Delivery  Fuad Dudley is a 39 y.o.  who was admitted at 38w5d for contractions. She was noted to have NRFHT remote from delivery and underwent a stat  section..     Delivery  Route of Delivery: , Low Transverse   Reason for  delivery: Category III FHR Tracing      Anesthesia: General,   QBL:        QBL:        Delivery: , Low Transverse at 2025 1:09 PM   Laceration: Perineal:   Repaired?      Baby's Weight: 2863 g (6 lb 5 oz); 101    Apgar scores: 7  and 9  at 1 and 5 minutes, respectively    Clinical Course: Post-Delivery:  The post delivery course was unremarkable.    On the day of discharge, the patient was ambulating, voiding spontaneously, tolerating oral intake, and hemodynamically stable. She was able to reasonably perform all ADLs. She had appropriate bowel function. Pain was well-controlled. She was discharged home on postpartum/postop day #4 without  complications (baby's bilirubin was elevated) . Patient was instructed to follow up with her OB as an outpatient and was given appropriate warnings to call her provider with problems or concerns.    Pertinent lab findings included:   Blood type O+.     Last three Hgb values:  Lab Results   Component Value Date    HGB 9.8 (L) 05/08/2025    HGB 11.3 (L) 05/07/2025    HGB 11.1 03/14/2025        Problem-specific follow-up plans included the following:       Discharge med list:  Contraception:       Medication List      START taking these medications     acetaminophen 325 mg tablet; Commonly known as: TYLENOL; Take 2 tablets   (650 mg total) by mouth every 6 (six) hours   docusate sodium 100 mg capsule; Commonly known as: COLACE; Take 1   capsule (100 mg total) by mouth 2 (two) times a day as needed for   constipation   ibuprofen 200 mg tablet; Commonly known as: MOTRIN; Take 3 tablets (600   mg total) by mouth every 6 (six) hours as needed for mild pain     CONTINUE taking these medications     escitalopram 10 mg tablet; Commonly known as: LEXAPRO; Take 1 tablet (10   mg total) by mouth daily   nystatin powder; Commonly known as: MYCOSTATIN; Apply topically 3   (three) times a day   PRENATAL+DHA PO       Condition at discharge:   good     Disposition:   Home    Planned Readmission:   No    Discharge Statement:  I have spent a total time of 30 minutes in caring for this patient on the day of the visit/encounter. >30 minutes of time was spent on: Impressions, Counseling / Coordination of care, Documenting in the medical record, Reviewing / ordering tests, medicine, procedures  , and Communicating with other healthcare professionals  .

## 2025-05-11 NOTE — DISCHARGE INSTR - AVS FIRST PAGE
You should take your blood pressure at home 1-2x daily.     Normal-range blood pressures in the postpartum period are less than 140 for the top number (systolic) AND less than 90 for the bottom number (diastolic).    You did have a few mildly elevated BP postpartum.    Mildly elevated blood pressures in the postpartum period are 140-159 systolic OR  diastolic.      If your blood pressures are persistently in the 150s systolic or 100s diastolic, you should call the office, as initiation/titration of blood pressure medication may be necessary.    Severe-range blood pressures in the postpartum period are 160 or greater systolic  or greater diastolic. You should NOT have any severely elevated blood pressures at home.     If your blood pressure is greater than 160/110, you should promptly call [facility] [place] at [phone] and proceed to the hospital for evaluation for pre-eclampsia.    Symptoms of Pre-Eclampsia include headache that does not go away with Tylenol, changes in vision such as blurred vision or spots in your vision, chest pain, shortness of breath, pain in the upper right side of your abdomen, or sudden onset or worsening of swelling.    If you develop any of the above symptoms, you should promptly call [facility] [place] at [phone] and present to the hospital for evaluation of pre-eclampsia.

## 2025-05-11 NOTE — PROGRESS NOTES
Progress Note - OB/GYN  Post-Partum Physician Note   Fuad Dudley 39 y.o. female MRN: [id number]  Unit/Bed#: -01 Encounter: [id number]  Assessment:  39 y.o. year-old , post-op/postpartum day #4 status-post 1LTCS    Plan:  Assessment & Plan  Status post primary low transverse  section  - Doing well postoperatively  - routine PP care  - Hgb 11.3 > 9.8, continue iron supplementation  Obesity affecting pregnancy, antepartum  BMI 37  - lovenox, SCDs and ambulation for VTE ppx  Elevated BP without diagnosis of hypertension  - sporadic mildly elevated BP, will need BP check in 1 week  - discussed checking BP ambulatory at home and instructions discussed  - Discussed reasons to call office after discharge, including but not limited to fever, shortness of breath, chest pain, severe headache, visual change, abdominal pain, heavy vaginal bleeding, persistent sadness or depression. Patient verbalized understanding.      ______________________________________________  Patient is postop and postpartum day #4 following 1LTCS for nonreassuring fetal heart tracing remote from delivery.     Subjective:   No acute events overnight. Denies HA, visual changes, epigastric pain. Denies shortness of breath or chest pain. Ambulating and voiding without difficulty.   Good urine output. Minimal lochia.      Objective:   Vitals:    05/10/25 0700 05/10/25 1500 05/10/25 2316 25 0700   BP: 117/82 122/70 135/80 126/66   BP Location: Left arm Left arm Right arm Right arm   Pulse: 72 71 72 61   Resp: 18 18 18 18   Temp: 97.7 °F (36.5 °C) 97.9 °F (36.6 °C) 97.8 °F (36.6 °C) 97.8 °F (36.6 °C)   TempSrc: Temporal Temporal Oral Oral   SpO2: 99% 98% 99% 98%   Weight:       Height:         No intake or output data in the 24 hours ending 25 1132    Physical Exam:  General: AOx3, NAD  Lungs: CTAB  CV: RRR  Abdomen: soft, fundus firm below umbilicus, appropriately tender; incision c/d/I (Mepilex)  Extremities: nontender with  "trace edema bilaterally      Labs/Tests:   Lab Results   Component Value Date/Time    CREATININE 0.69 2025 12:36 PM    ALT 16 2025 12:36 PM    AST 29 2025 12:36 PM        Brief OB Lab review:  ABO Grouping   Date Value Ref Range Status   2025 O  Final      Rh Factor   Date Value Ref Range Status   2025 Positive  Final     Rh Type   Date Value Ref Range Status   2024 Positive  Final     Comment:     Please note: Prior records for this patient's ABO / Rh type are not  available for additional verification.      No results found for: \"ANTIBODYSCR\"  No results found for: \"RUBM\"    MEDS:     Current Facility-Administered Medications:     acetaminophen (TYLENOL) tablet 650 mg, Q6H ADITYA    aluminum-magnesium hydroxide-simethicone (MAALOX) oral suspension 15 mL, Q6H PRN    bisacodyl (DULCOLAX) rectal suppository 10 mg, Daily PRN    bupivacaine (PF) (MARCAINE) 0.25 % injection 30 mL, Once PRN    diphenhydrAMINE (BENADRYL) injection 25 mg, Q6H PRN    docusate sodium (COLACE) capsule 100 mg, BID    enoxaparin (LOVENOX) subcutaneous injection 40 mg, Q24H ADITYA    escitalopram (LEXAPRO) tablet 10 mg, HS    fentaNYL (SUBLIMAZE) injection 25 mcg, Q5 Min PRN    HYDROmorphone (DILAUDID) injection 0.4 mg, Q10 Min PRN    [] ketorolac (TORADOL) injection 30 mg, Q6H ADITYA **FOLLOWED BY** ibuprofen (MOTRIN) tablet 600 mg, Q6H    ondansetron (ZOFRAN) injection 4 mg, Q6H PRN    ondansetron (ZOFRAN) injection 4 mg, Once PRN    simethicone (MYLICON) chewable tablet 80 mg, 4x Daily PRN  Invasive Devices       None                     Shwetha Andrews MD  2025 11:32 AM               "

## 2025-05-12 NOTE — UTILIZATION REVIEW
NOTIFICATION OF ADMISSION DISCHARGE   This is a Notification of Discharge from [facility]. Please be advised that this patient has been discharge from our facility. Below you will find the admission and discharge date and time including the patient’s disposition.   UTILIZATION REVIEW CONTACT:  Utilization Review Assistants  Network Utilization Review Department  Phone: [phone] x carefully listen to the prompts. All voicemails are confidential.  Email: [email or web address]     ADMISSION INFORMATION  PRESENTATION DATE: 5/7/2025 12:17 PM  OBERVATION ADMISSION DATE: N/A  INPATIENT ADMISSION DATE: 5/7/25 12:42 PM   DISCHARGE DATE: 5/11/2025  3:54 PM   DISPOSITION:Home/Self Care    Network Utilization Review Department  ATTENTION: Please call with any questions or concerns to [phone] and carefully listen to the prompts so that you are directed to the right person. All voicemails are confidential.   For Discharge needs, contact Care Management DC Support Team at [phone] opt. 2  Send all requests for admission clinical reviews, approved or denied determinations and any other requests to dedicated fax number below belonging to the campus where the patient is receiving treatment. List of dedicated fax numbers for the Facilities:  FACILITY NAME UR FAX NUMBER   ADMISSION DENIALS (Administrative/Medical Necessity) [phone]   DISCHARGE SUPPORT TEAM ([facility]) [phone]   PARENT CHILD HEALTH (Maternity/NICU/Pediatrics) [phone]   [facility] [phone]   [facility] [phone]   [facility] [phone]   [facility] [phone]   [facility] [phone]   [facility] [phone]   [facility] [phone]   [facility] [phone]   [facility]  [facility] [phone]   Critical access hospital [phone]   [facility] [phone]   [facility] [phone]

## 2025-05-12 NOTE — UTILIZATION REVIEW
"    MOTHER AND BABY DISCHARGED MAY 11    NOTIFICATION OF INPATIENT ADMISSION   MATERNITY/DELIVERY AUTHORIZATION REQUEST   SERVICING FACILITY:   [facility]  Parent Child Health - L&D, , NICU  [address] [place] [address]  Tax ID: [id number]  NPI: [id number] ATTENDING PROVIDER:  Attending Name and NPI#: Bhavana Jones Md [[id number]]  Address: [address] [place] [address]  Phone: [phone]     ADMISSION INFORMATION:  Place of Service: Inpatient [facility]  Place of Service Code: 21  Inpatient Admission Date/Time: 25 12:42 PM  Discharge Date/Time: 2025  3:54 PM  Admitting Diagnosis Code/Description:  No admission diagnoses are documented for this encounter.     Mother: Fuad Dudley 1985 Estimated Date of Delivery: 25  Delivering clinician: Bhavana Jones   OB History          5    Para   5    Term   5            AB        Living   5         SAB        IAB        Ectopic        Multiple   0    Live Births   5                Name & MRN:   Information for the patient's :  Deborah Dudleyabhay JOHN [[id number]]    Delivery Information:  Sex: male  Delivered 2025 1:09 PM by , Low Transverse; Gestational Age: 38w5d     Measurements:  Weight: 6 lb 5 oz (2863 g);  Height: 20\"    APGAR 1 minute 5 minutes 10 minutes   Totals: 7 9       UTILIZATION REVIEW CONTACT:  Agata Kennedy Utilization   Network Utilization Review Department  Phone: [phone]  Fax [phone]  Email: [email or web address]  Contact for approvals/pending authorizations, clinical reviews, and discharge.     PHYSICIAN ADVISORY SERVICES:  Medical Necessity Denial & Xhdk-mw-Umve Review  Phone: [phone]  Fax: [phone]  Email: [email or web address]     DISCHARGE SUPPORT TEAM:  For Patients Discharge Needs & Updates  Phone: [phone] opt. 2 Fax: [phone]  Email: " [email or web address]     NOTIFICATION OF ADMISSION DISCHARGE   This is a Notification of Discharge from [facility]. Please be advised that this patient has been discharge from our facility. Below you will find the admission and discharge date and time including the patient’s disposition.   UTILIZATION REVIEW CONTACT:  Utilization Review Assistants  Network Utilization Review Department  Phone: [phone] x carefully listen to the prompts. All voicemails are confidential.  Email: [email or web address]     ADMISSION INFORMATION  PRESENTATION DATE: 5/7/2025 12:17 PM  OBERVATION ADMISSION DATE: N/A  INPATIENT ADMISSION DATE: 5/7/25 12:42 PM   DISCHARGE DATE: 5/11/2025  3:54 PM   DISPOSITION:Home/Self Care    Network Utilization Review Department  ATTENTION: Please call with any questions or concerns to [phone] and carefully listen to the prompts so that you are directed to the right person. All voicemails are confidential.   For Discharge needs, contact Care Management DC Support Team at [phone] opt. 2  Send all requests for admission clinical reviews, approved or denied determinations and any other requests to dedicated fax number below belonging to the campus where the patient is receiving treatment. List of dedicated fax numbers for the Facilities:  FACILITY NAME UR FAX NUMBER   ADMISSION DENIALS (Administrative/Medical Necessity) [phone]   DISCHARGE SUPPORT TEAM ([facility]) [phone]   PARENT CHILD HEALTH (Maternity/NICU/Pediatrics) [phone]   [facility] [phone]   [facility] [phone]   [facility] [phone]   [facility] [phone]   [facility] [phone]   [facility] [phone]   [facility] [phone]   [facility]  [place] [phone]   [facility] [phone]   [facility] [phone]   [facility] [phone]   [facility] [phone]

## 2025-05-14 PROCEDURE — 88307 TISSUE EXAM BY PATHOLOGIST: CPT | Performed by: STUDENT IN AN ORGANIZED HEALTH CARE EDUCATION/TRAINING PROGRAM

## 2025-05-15 ENCOUNTER — TELEPHONE (OUTPATIENT)
Dept: OBGYN CLINIC | Facility: CLINIC | Age: 40
End: 2025-05-15

## 2025-05-15 NOTE — TELEPHONE ENCOUNTER
POSTPARTUM PHONE CALL ASSESSMENT      Attempted to call patient. Left message on pt's vm and mychart message sent.    Date of Delivery: 5/7/25  Delivering Provider: Dr. Jones   Mode: LTCS  Delivery Notes/Complications: She was admitted at 38w5d for contractions. She noted to have NRFHT remote from delivery and underwent a stat caesarean section. The post delivery course was unremarkable.      Do you still have bleeding/pain? If so, how much/how severe?      Regular BMs/Urination?     Breastfeeding/Formula/Both?     How are you doing emotionally?    If struggling, obtain a EPDS Score:     Do you have any other questions or concerns for us or your provider?     Have you scheduled the pediatrician appointment with pediatrician?     Do you have a postpartum visit scheduled? yes   Date scheduled: 6/3/25 Provider: Dr. Jones

## 2025-05-16 ENCOUNTER — POSTPARTUM VISIT (OUTPATIENT)
Dept: OBGYN CLINIC | Facility: CLINIC | Age: 40
End: 2025-05-16

## 2025-05-16 VITALS
SYSTOLIC BLOOD PRESSURE: 108 MMHG | HEIGHT: 66 IN | WEIGHT: 221 LBS | DIASTOLIC BLOOD PRESSURE: 64 MMHG | BODY MASS INDEX: 35.52 KG/M2

## 2025-05-16 DIAGNOSIS — R23.8 SKIN IRRITATION: ICD-10-CM

## 2025-05-16 DIAGNOSIS — Z98.891 STATUS POST PRIMARY LOW TRANSVERSE CESAREAN SECTION: Primary | ICD-10-CM

## 2025-05-16 PROCEDURE — 99024 POSTOP FOLLOW-UP VISIT: CPT | Performed by: STUDENT IN AN ORGANIZED HEALTH CARE EDUCATION/TRAINING PROGRAM

## 2025-05-16 RX ORDER — CEPHALEXIN 500 MG/1
500 CAPSULE ORAL EVERY 6 HOURS SCHEDULED
Qty: 28 CAPSULE | Refills: 0 | Status: SHIPPED | OUTPATIENT
Start: 2025-05-16 | End: 2025-05-23

## 2025-05-16 NOTE — PROGRESS NOTES
[facility] [place]  [address] [place] [address]    Assessment/Plan:  Fuad is a 39 y.o. year old  who presents for incision check. RTC for routine post partum visit on 6/3/25   Assessment & Plan  Status post primary low transverse  section  - BP normotensive, reports home BP monitoring similar, denies symptoms   - Incision intact and clean but erythema surrounding it, no fluctuant area or drainage   - Patient reports her Mepilex fell off last week, has been using gauze   - Reviewed can use yasmeen pad and change throughout the day to have a covering if needed   - Will initiate 7 days of PO antibiotics given concern for developing cellulitis    - Has follow up on 6/3 for routine postpartum visit        Skin irritation    Orders:    cephalexin (KEFLEX) 500 mg capsule; Take 1 capsule (500 mg total) by mouth every 6 (six) hours for 7 days      Subjective:     Fuad Dudley is a 39 y.o. y.o. female  who presents for a incision check. Patient was admitted at 38w5d for contractions. She was noted to have NRFHT remote from delivery and underwent a stat  section.  cc. Hb trend 11.3> 9.8. Post op she did have elevated BP but did not meet criteria for hypertensive disease. She was discharged home on POD 4. She is here for a incision check.     Postpartum course has been unremarkable.     Bleeding staining only. Bowel function is normal. Bladder function is normal. Patient is not sexually active.     Postpartum Depression: Low Risk  (2025)    Maceo  Depression Scale     Last EPDS Total Score: 4     Last EPDS Self Harm Result: Never   Recent Concern: Postpartum Depression - Medium Risk (2025)    [place]aceo  Depression Scale     Last EPDS Total Score: 8     Last EPDS Self Harm Result: Never     The following portions of the patient's history were reviewed and updated as appropriate: allergies, current medications, past family history, past medical  "history, obstetric history, gynecologic history, past social history, past surgical history and problem list.    Objective:  /64 (BP Location: Left arm, Patient Position: Sitting, Cuff Size: Standard)   Ht 5' 6\" (1.676 m)   Wt 100 kg (221 lb)   LMP 2024 (Approximate)   Breastfeeding Yes   BMI 35.67 kg/m²   Pregravid Weight/BMI: 112 kg (247 lb) (BMI 39.89)  Current Weight: 100 kg (221 lb)   Total Weight Gain: -6.804 kg (-15 lb)   Pre- Vitals      Flowsheet Row Most Recent Value   Prenatal Assessment    Prenatal Vitals    Blood Pressure 108/64   Weight - Scale 100 kg (221 lb)   Urine Albumin/Glucose    Dilation/Effacement/Station    Vaginal Drainage    Edema            General Appearance: alert and oriented, in no acute distress.   Abdomen: Soft, non-tender, non-distended, no masses, no rebound or guarding. Clean and dry and intact. Surrounding erythema, concern for infection given high risk   Extremities: Normal range of motion.   Skin: normal, no rash or abnormalities  Neurologic: alert, oriented x3  Psychiatric: Appropriate affect, mood stable, cooperative with exam.      Richar House MD  2025 1:52 PM   " No significant past surgical history

## 2025-05-16 NOTE — ASSESSMENT & PLAN NOTE
- BP normotensive, reports home BP monitoring similar, denies symptoms   - Incision intact and clean but erythema surrounding it, no fluctuant area or drainage   - Patient reports her Mepilex fell off last week, has been using gauze   - Reviewed can use yasmeen pad and change throughout the day to have a covering if needed   - Will initiate 7 days of PO antibiotics given concern for developing cellulitis    - Has follow up on 6/3 for routine postpartum visit

## 2025-05-19 ENCOUNTER — OFFICE VISIT (OUTPATIENT)
Dept: POSTPARTUM | Facility: CLINIC | Age: 40
End: 2025-05-19

## 2025-05-19 NOTE — PATIENT INSTRUCTIONS
"-Continue offering Deborahn the breast when motivated to do so.  -Sit comfortably reclined when offering him the breast, allow him to reach \"up\" to latch. You should feel strong, comfortable tugging, hear him swallowing, and feel your breasts get softer after the nursing session.   -for nipple healing - apply nipple balm of choice, and cover with wax or parchment paper in between nursing sessions.  -Continue to pump to replace missed breastfeeding sessions or of latching becomes too painful again.   -Use lowest effective suction when pumping. Pumping should never be painful.  -Follow up with breastfeeding medicine for Deborahn as scheduled.     "

## 2025-05-19 NOTE — PROGRESS NOTES
INITIAL BREAST FEEDING EVALUATION    Informant/Relationship: Fuad (mom/self) and Holger (3 yo sibling)     Discussion of General Lactation Issues: Lucio latch is always painful, lactation in the hospital and Ped thought he may have a tongue tie. Mom is pumping and bottle feeding at this time.     Infant is 12 days  old today.        History:  Fertility Problem:no  Breast changes:yes - enlargement   : emergency C/S, baby was having decelerations. Vacuum assist C/S.    Full term:yes - 38 and 5 weeks    labor:no  First nursing/attempt < 1 hour after birth:yes - Mom feels this was a good latch, although it was painful   Skin to skin following delivery:yes - in PACU  Breast changes after delivery:yes - a few days postpartum   Rooming in (infant in room with mother with exception of procedures, eg. Circumcision: yes - entire say   Blood sugar issues:no  NICU stay:no  Jaundice:yes - elevated   Phototherapy:yes - stayed 2 extra days for that treatment   Supplement given: (list supplement and method used as well as reason(s):no    Past Medical History:   Diagnosis Date    Depression     Migraine     Menstrual cycle related-treated with Imitrex as needed.       Current Medications[1]    No Known Allergies    Social History     Substance and Sexual Activity   Drug Use Never       Social History     Interval Breastfeeding History:    Frequency of breast feeding: has not done this for about 1 week   Does mother feel breastfeeding is effective: No  Does infant appear satisfied after nursing:No  Stooling pattern normal: Yes  Urinating frequently:Yes  Using shield or shells: No    Alternative/Artificial Feedings:   Bottle: Yes, Dr. Brown's, baby drinks quickly, Mom unaware of pacing   Cup: No  Syringe/Finger: No           Formula Type: no                     Amount: n/a            Breast Milk:                      Amount: 2-3 oz             Frequency Q 3 Hr between feedings  Elimination Problems:  No      Equipment:  Nipple Shield             Type: no             Size: n/a             Frequency of Use: n/a  Pump            Type: Tsrete wearable and Spectra S2             Frequency of Use: every 4 hours, she is making what baby needs       Equipment Problems: no    Mom:  Breast: Normal  Nipple Assessment in General: Normal: elongated/eraser, no discoloration and no damage noted.  Mother's Awareness of Feeding Cues                 Recognizes: Yes                  Verbalizes: Yes  Support System: good support   History of Breastfeeding: Has 5 other children, tried with the first child, but did not have great support or interest, so this is her first time breastfeeding.   Changes/Stressors/Violence: baby's latch is painful for Mom, concerns about a tongue tie   Concerns/Goals: Provide breast milk until baby is ready to wean.     Problems with Mom: none    Physical Exam  Constitutional:       Appearance: Normal appearance.   HENT:      Head: Normocephalic.   Pulmonary:      Effort: Pulmonary effort is normal.     Musculoskeletal:         General: Normal range of motion.      Cervical back: Normal range of motion.     Neurological:      General: No focal deficit present.      Mental Status: She is alert and oriented to person, place, and time.     Skin:     General: Skin is warm.      Capillary Refill: Capillary refill takes less than 2 seconds.     Psychiatric:         Mood and Affect: Mood normal.         Behavior: Behavior normal.         Thought Content: Thought content normal.         Judgment: Judgment normal.         Infant:  Behaviors: Alert  Color: Pink  Birth weight: 2863 g   Current weight: 3090 g     Problems with infant: tension, short frenulum       General Appearance:  Alert, active, no distress                             Head:  Normocephalic, facial asymmetry noted, AFOF, sutures opposed, lump noted to the top/left skull                              Eyes:  Conjunctiva clear, no drainage                               Ears:  Normally placed, no anomolies                             Nose:  Septum intact, no drainage or erythema                           Mouth:  No lesions. Tongue is flat when crying, extends past lip, tips on edge when lateralizing. Full cup on gloved finger after a few seconds of stimulation, but only compression felt. Frenulum is thin and webbed, less than 1 cm in length on manual lift, connects just behind lower alveolar ridge and tongue tip, tongue is v-shaped on lift.                     Neck:  tension when turning to the right shoulder, symmetrical, trachea midline                 Respiratory:  No grunting, flaring, retractions, breath sounds clear and equal            Cardiovascular:  Regular rate and rhythm. No murmur. Adequate perfusion/capillary refill. Femoral pulse present                    Abdomen:   Soft, non-tender, no masses, bowel sounds present, no HSM             Genitourinary:  Normal male, testes descended, no discharge, swelling, or pain, anus patent                          Spine:   No abnormalities noted        Musculoskeletal:  Full range of motion          Skin/Hair/Nails:   Skin warm, dry, and intact, no rashes or abnormal dyspigmentation or lesions                Neurologic:   No abnormal movement, tone appropriate for gestational age    [place] Latch:  Efficiency:               Lips Flanged: Yes              Depth of latch: wide               Audible Swallow: Yes              Visible Milk: Yes              Wide Open/ Asymmetrical: Yes              Suck Swallow Cycle: Breathing: yes, Coordinated: yes  Nipple Assessment after latch: Normal: elongated/eraser, no discoloration and no damage noted.  Latch Problems: Ronak positioned to reach up for the breast, wide gape and Mom hugs him gently to the breast and achieved a deep initial attachment. Baby takes a few short bursts of sucking and then a long pause. He responds well to gentle areolar  compression    Position:  Infant's Ergonomics/Body               Body Alignment: Yes               Head Supported: Yes               Close to Mom's body/ Lifted/ Supported: Yes               Mom's Ergonomics/Body: Yes, needed reminders to comfortably recline and relax shoulder                            Supported: Yes                           Sitting Back: Yes                           Brings Baby to her breast: Yes  Positioning Problems: Mom is able to return demonstration after her review.       Education:  Reviewed Latch: importance of deep latch without pain.   Reviewed Positioning for Dyad: proper alignment and head angle when positioning at the breast   Reviewed Frequency/Supply & Demand: offer the breast at each feeding, pump if baby is not latching and effective transferring milk.   Reviewed Infant:Cues and varied States of Awareness: watch for hunger cues, feed on demand. If baby seems satisfied at the breast (calm, relaxed sleeping, breasts are softer) no need to pump or supplement   Reviewed Infant Elimination: goal of 6+ wets and 2-3 stools per day   Reviewed Alternative/Artificial Feedings: paced bottle feeding technique demonstrated  Reviewed Mom/Breast care: gentle handling of the breast at all times, as well as tips for healing sore nipples.    Reviewed Equipment: Hand pump and electric pump general guidance, Discussed proper flange fit, how to measure        Plan:  Continue to offer baby the breast or bottle on demand,  goal of 8 feedings per day and watch for signs of a wide latch and active drinking throughout feeding. Breast compressions throughout feeding to keep baby active, as needed. Paced bottle feeding recommended if offering pumped milk via bottle.  Monitor diapers daily, follow up with Ped as recommended. Follow up with lactation as needed for ongoing support. PT consult placed. Follow up with breastfeeding medicine as scheduled.     Reassurance provided that baby is growing well at this  time. Cont with positioning adjustments and watch for signs of effective feeding when offering the breast. Pump cont to replace feeding at the breast with bottle feeding or if latching becomes painful. Gentle handling of the breast at all times to preserve integrity.  Contact [facility] for breastfeeding support as needed or ongoing concerns with latching comfort and milk transfer.  Follow up with breastfeeding medicine as scheduled.     I have spent 75 minutes with Patient and family today in which greater than 50% of this time was spent in counseling/coordination of care regarding Patient and family education.              [1]   Current Outpatient Medications:     acetaminophen (TYLENOL) 325 mg tablet, Take 2 tablets (650 mg total) by mouth every 6 (six) hours (Patient not taking: Reported on 5/16/2025), Disp: , Rfl:     cephalexin (KEFLEX) 500 mg capsule, Take 1 capsule (500 mg total) by mouth every 6 (six) hours for 7 days, Disp: 28 capsule, Rfl: 0    docusate sodium (COLACE) 100 mg capsule, Take 1 capsule (100 mg total) by mouth 2 (two) times a day as needed for constipation (Patient not taking: Reported on 5/16/2025), Disp: , Rfl:     escitalopram (LEXAPRO) 10 mg tablet, Take 1 tablet (10 mg total) by mouth daily, Disp: 90 tablet, Rfl: 2    ibuprofen (MOTRIN) 200 mg tablet, Take 3 tablets (600 mg total) by mouth every 6 (six) hours as needed for mild pain (Patient not taking: Reported on 5/16/2025), Disp: , Rfl:     nystatin (MYCOSTATIN) powder, Apply topically 3 (three) times a day, Disp: 60 g, Rfl: 1    Prenatal MV-Min-Fe Fum-FA-DHA (PRENATAL+DHA PO), Take by mouth, Disp: , Rfl:

## 2025-06-02 ENCOUNTER — OFFICE VISIT (OUTPATIENT)
Age: 40
End: 2025-06-02
Payer: COMMERCIAL

## 2025-06-02 VITALS — DIASTOLIC BLOOD PRESSURE: 76 MMHG | SYSTOLIC BLOOD PRESSURE: 114 MMHG

## 2025-06-02 PROCEDURE — 99401 PREV MED CNSL INDIV APPRX 15: CPT | Performed by: PEDIATRICS

## 2025-06-02 PROCEDURE — 99215 OFFICE O/P EST HI 40 MIN: CPT | Performed by: PEDIATRICS

## 2025-06-02 NOTE — PATIENT INSTRUCTIONS
"Continue to breastfeed on demand, as often as you feel comfortable.    Express breast milk whenever Mack is fed by bottle and as needed, for comfort.    Expressing breast milk once daily, immediately after breastfeeding especially early in the day, can be a great way to build a \"safety net\" for planned or unplanned separations. You are looking to meet, but not overly exceed Mack's needs.    Your flanges should fit so that your nipple moves freely in the tunnel with little to no areola joining it. Use the lowest effective suction.  "

## 2025-06-02 NOTE — PROGRESS NOTES
"Name: Fuad Dudley      : 1985      MRN: [id number]  Encounter Provider: Charley Winston MD  Encounter Date: 2025   Encounter department: [facility]      BREAST FEEDING FOLLOW UP VISIT  :  Assessment & Plan  Encounter for care or examination of lactating mother       Discussed history and physical exams with Fuad. Support given fo her commitment to providing breast milk for her baby. Discussed the findings on the baby's exam consistent with tongue tie and reviewed how this may be the cause of nipple trauma, nipple pain, nipple damage, poor milk transfer, blocked ducts, mastitis, and loss of milk production. Discussed the science that supports performing the frenotomy to improve latch.   Recommended breastfeeding on demand, expressing breast milk whenever feeding by bottle or as needed for comfort. Encouraged expressing breast milk early in the day to build a \"safety net\" for planned or unplanned separations. Reviewed how to determine the best flange size.       History of Present Illness       Informant/Relationship: Fuad/mom    Discussion of General Lactation Issues: This is Fuad's first experience breastfeeding. She formula fed all of her other children, but knew that something was wrong because breastfeeding has been painful from Mack's first latch.    Fuad took a break from directly breastfeeding, but is feeding at the breast again. Both breastfeeding and pumping has been painful, but Fuad just ordered new flanges. She used the flange measuring tool provided by her pump.  Fuad has never cracking or bleeding. Mack does not make funny noises while feeding at either the breast or bottle and he does not spill. Cubas nipples are not deformed by Mack's breastfeeding. Mack's attachment is 8/10 with attachment and then decreases to 4-5/10 for the rest of the feeding. She has more pain on left.    Tongue tie was noted early, first by " MARIO in the hospital and then by JOE in pediatric office.    Infant is 26 days old today.         Interval Breastfeeding History:    Frequency of breast feeding: about every 3 hours on average; sometimes sleeps up to 5 hours during the day  Does mother feel breastfeeding is effective: Yes, but painful  Does infant appear satisfied after nursing:Yes, but painful  Stooling pattern normal:Yes  Urinating frequently:Yes  Using shield or shells: no    Alternative/Artificial Feedings:   Bottle: Yes, Dr. Brown's or Ephraim's, Slow flow nipples; not using paced feeding  Cup: No  Syringe/Finger: No           Formula Type: n/a                     Amount: n/a            Breast Milk:                      Amount: 5 oz            Frequency no more than twice daily  Elimination Problems: No      Equipment:  Nipple Shield             Type: n/a             Size: n/a             Frequency of Use: n/a  Pump            Type: Tsrete and Spectra S1 (primary); currently using flanges that came with the pump, but ordered new; maximum vacuum is 3            Frequency of Use: typically pump when a feeding is missed at the breast; typically pumps enough to store plus offer any bottles needed  Shells            Type: n/a            Frequency of use: n/a    Equipment Problems:yes - flanges are too big, but has ordered new ones      Mom:  Breast: Rounded, full, right breast feels sparks than the left; slight tenderness but no fullness nor other abnormality noted just to medially to the nipple on the left areola  Nipple Assessment in General: Normal: elongated/eraser, no discoloration and no damage noted.  Mother's Awareness of Feeding Cues                 Recognizes: Yes                  Verbalizes: Yes  Support System: FOB  History of Breastfeeding: tried a little with her first, but she didn't click with the LC in the hospital and Fuad was young and inexperienced; never tried with the other children until now  Changes/Stressors/Violence:  Breastfeeding is painful  Concerns/Goals: Fuad plans to continue to breastfeed while she is home and provide her milk for as long as it is working for both her and Jared.    Problems with Mom: Painful breastfeeding    Objective   /76 (BP Location: Left arm, Patient Position: Sitting, Cuff Size: Adult)   LMP 07/30/2024 (Approximate)   Breastfeeding Yes        Physical Exam  Constitutional:       Appearance: Normal appearance. She is well-developed and normal weight.   HENT:      Head: Normocephalic and atraumatic.     Eyes:      Extraocular Movements: Extraocular movements intact.     Neck:      Thyroid: No thyromegaly.     Cardiovascular:      Rate and Rhythm: Normal rate and regular rhythm.      Pulses: Normal pulses.      Heart sounds: Normal heart sounds. No murmur heard.  Pulmonary:      Effort: Pulmonary effort is normal.      Breath sounds: Normal breath sounds.     Musculoskeletal:      Cervical back: Normal range of motion and neck supple.   Lymphadenopathy:      Cervical: No cervical adenopathy.      Upper Body:      Right upper body: No pectoral adenopathy.      Left upper body: No pectoral adenopathy.     Neurological:      General: No focal deficit present.      Mental Status: She is alert and oriented to person, place, and time.     Psychiatric:         Mood and Affect: Mood normal.         Behavior: Behavior normal.         Thought Content: Thought content normal.         Judgment: Judgment normal.   Vitals and nursing note reviewed.             Infant:  Behaviors: Sleepy, but easily aroused and then shows signs of hunger  Color: Healthy  Birth weight: 2.863 kg  Current weight: 3.555 kg    Problems with infant: Tongue tie, slight increased tone      General Appearance:  Alert, active, no distress                             Head:  Normocephalic, AFOF, sutures opposed                             Eyes:  Conjunctiva clear, no drainage                              Ears:  Normally placed, no  anomolies                             Nose:  Septum intact, no drainage or erythema                           Mouth:  No lesions; tongue extends just barely to the lower lip and has decreased lateralization bilaterally with notable dimpling noted in the tip; there is pursing of the lips with sucking of the examiner's finger and poor cupping of the examiner's finger; the seal is easily broken with traction placed on the mandible; the frenulum is thin and has little elasticity; the frenulum extends from the tongue where it leaves 1/8 of the tongue blade free to the crest of the mandibular gum ridge                    Neck:  Supple, symmetrical, trachea midline, no adenopathy; thyroid: no enlargement, symmetric, no tenderness/mass/nodules; slight decreased passive rotation to the right                 Respiratory:  No grunting, flaring, retractions, breath sounds clear and equal            Cardiovascular:  Regular rate and rhythm. No murmur. Adequate perfusion/capillary refill. Femoral pulse present                    Abdomen:   Soft, non-tender, no masses, bowel sounds present, no HSM             Genitourinary:  Normal male, testes descended, no discharge, swelling, or pain, anus patent                          Spine:   No abnormalities noted        Musculoskeletal:  Full range of motion          Skin/Hair/Nails:   Skin warm, dry, and intact, no rashes or abnormal dyspigmentation or lesions                Neurologic:   No abnormal movement, tone appropriate for gestational age     Latch:  Efficiency:               Lips Flanged: Yes, after frenotomy              Depth of latch: Very good, after frenotomy              Audible Swallow: Yes, after frenotomy              Visible Milk: Yes, after frenotomy              Wide Open/ Asymmetrical: Yes, after frenotomy              Suck Swallow Cycle: Breathing: Unlabored, Coordinated: Yes  Nipple Assessment after latch: Normal: elongated/eraser, no discoloration and no  "damage noted.  Latch Problems: After the frenotomy, Fuad easily assisted Mack to a wider, ,deeper, more asymmetrical, and more comfortable attachment at each breast. She had no pain on the right and much decreased pain on the left with some residual discomfort where the areola is tender. Mack quickly attained a sustained SSB and  at both breasts until fully content.     Position:  Infant's Ergonomics/Body               Body Alignment: Yes               Head Supported: Yes               Close to Mom's body/ Lifted/ Supported: Yes               Mom's Ergonomics/Body:Yes                           Supported: Yes                           Sitting Back: Yes                           Brings Baby to her breast: Yes  Positioning Problems: None        Education:  Reviewed Latch: Reviewed how to gently compress the breast as if offering a sandwich to facilitate a deeper latch.    Reviewed Positioning for Dyad: Reviewed how to bring baby to the breast so that his lower lip and chin touch the breast with his nose just above the nipple to encourage a wider, more asymmetric latch.   Reviewed Frequency/Supply & Demand: Recommended feeding on demand: when the baby gives hunger cues, when the breasts feel full, every 3 hours during the day and every 5 hours at night counting from the beginning of one feeding to the beginning of the next; whichever comes first.    Reviewed Alternative/Artificial Feedings: Paced bottle feeding  Reviewed Mom/Breast care: Express breast milk whenever Mack is fed by bottle and as needed for comfort; discussed when to express breast milk to build a \"safety net\" for planned or planned separations  Reviewed Equipment: Reviewed how to determine the best flange size. Recommended using a low, but effective flange size.    Administrative Statements   I have spent a total time of 60 minutes in caring for this patient on the day of the visit/encounter including Prognosis, Risks and benefits of tx " options, Instructions for management, Patient and family education, Importance of tx compliance, Risk factor reductions, Impressions, Counseling / Coordination of care, Documenting in the medical record, and Reviewing/placing orders in the medical record (including tests, medications, and/or procedures).

## 2025-07-08 ENCOUNTER — OFFICE VISIT (OUTPATIENT)
Age: 40
End: 2025-07-08
Payer: COMMERCIAL

## 2025-07-08 PROCEDURE — 99404 PREV MED CNSL INDIV APPRX 60: CPT | Performed by: PEDIATRICS

## 2025-07-08 NOTE — PROGRESS NOTES
BREAST FEEDING FOLLOW UP VISIT    Informant/Relationship: Fuad (mom/self)     Discussion of General Lactation Issues: Fuad reports that Ronak has a very painful intimal latch that improved but never to the point of her being fully comfortable.     PT consult has been placed, but family has not started that yet.     Infant, Ronak, is 2 mo old today.    Interval Breastfeeding History:    Frequency of breast feeding: on demand, 8-12 feeds per day   Does mother feel breastfeeding is effective: Yes  Does infant appear satisfied after nursing:Yes  Stooling pattern normal:Yes  Urinating frequently:Yes  Using shield or shells:No    Alternative/Artificial Feedings:   Bottle: Yes, Dr. Brown's, level 1 nipples, trying pacing the feedings   Cup: No  Syringe/Finger: No           Formula Type: no                     Amount: n/a            Breast Milk:                      Amount: 4 oz            Frequency Q 1 feeding per day if that, will give bottles if out and about   Elimination Problems: No      Equipment:  Nipple Shield             Type: no             Size: n/a             Frequency of Use: n/a  Pump            Type: Wearable from [employer]             Frequency of Use: after nursing if he doesn't eat a lot.     At least 2 x per day , morning and night. Collects about 6 oz per pump - reports this is not being saved but fed back to Ronak.     Equipment Problems: no      Mom:  Breast: Normal  Nipple Assessment in General: Normal: elongated/eraser, no discoloration and no damage noted. Reports tenderness   Mother's Awareness of Feeding Cues                 Recognizes: Yes                  Verbalizes: Yes  Support System: FOB  History of Breastfeeding: tried a little with her first, but she didn't click with the LC in the hospital and Fuad was young and inexperienced; never tried with the other children until now  Changes/Stressors/Violence: Breastfeeding is painful, baby had a frenotomy and things were improved briefly    Concerns/Goals: Fuad plans to continue to breastfeed while she is home and provide her milk for as long as it is working for both her and Jared.    Problems with Mom: painful breastfeeding, fast letdown     Physical Exam  Constitutional:       Appearance: Normal appearance.   Pulmonary:      Effort: Pulmonary effort is normal.     Musculoskeletal:         General: Normal range of motion.      Cervical back: Normal range of motion.     Neurological:      General: No focal deficit present.      Mental Status: She is alert and oriented to person, place, and time.     Skin:     General: Skin is warm.      Capillary Refill: Capillary refill takes less than 2 seconds.     Psychiatric:         Mood and Affect: Mood normal.         Behavior: Behavior normal.         Thought Content: Thought content normal.         Judgment: Judgment normal.       Infant:  Behaviors: Alert  Color: Pink  Birth weight: 2863 g   Current weight: 4745 g     Problems with infant: s/p frenotomy, general tension      General Appearance:  Alert, active, no distress                             Head:  Normocephalic, AFOF, sutures opposed                             Eyes:  Conjunctiva clear, no drainage                              Ears:  Normally placed, no anomolies                             Nose:  Septum intact, no drainage or erythema                           Mouth:  No lesions. Tongue has slight notch in tip with any movement. Narrow gape. Lateralizes well, full cup on gloved finger. He is able to cup my finger fully and peristalsis is felt with chin support. Tongue is rounded on manual lift but does not reach to his palate.                     Neck:  Supple, symmetrical, trachea midline                 Respiratory:  No grunting, flaring, retractions, breath sounds clear and equal            Cardiovascular:  Regular rate and rhythm. No murmur. Adequate perfusion/capillary refill. Femoral pulse present                    Abdomen:   Soft,  "non-tender, no masses, bowel sounds present, no HSM             Genitourinary:  Normal male, testes descended, no discharge, swelling, or pain, anus patent                          Spine:   No abnormalities noted        Musculoskeletal:  Full range of motion - Mom reports baby is \"Always moving\", very wiggly. Becomes tense when placed on his belly.           Skin/Hair/Nails:   Skin warm, dry, and intact, no rashes or abnormal dyspigmentation or lesions                Neurologic:   No abnormal movement, tone appropriate for gestational age     Latch:  Efficiency:               Lips Flanged: Yes              Depth of latch: narrow              Audible Swallow: Yes, gulps              Visible Milk: Yes, sprays from the nipple when baby unlatches              Wide Open/ Asymmetrical: Yes              Suck Swallow Cycle: Breathing: yes, Coordinated: no - on and off the breast, coughing  Nipple Assessment after latch: Normal: elongated/eraser, no discoloration and no damage noted.  Latch Problems: There is little comfortable/relaxed feeding behaviors observed. When trying to get him to have a deep latch he takes a large gulp and comes off the breast coughing. Milk sprays from the nipple when he unlatches. Fuad is reporting painful latch on tenderness. Resolves when flow slows.     Position:  Infant's Ergonomics/Body               Body Alignment: Yes               Head Supported: Yes               Close to Mom's body/ Lifted/ Supported: Yes               Mom's Ergonomics/Body: Yes                           Supported: Yes                           Sitting Back: Yes                           Brings Baby to her breast: Yes  Positioning Problems: none     Education:  Reviewed Latch: importance of deep latch without pain.   Reviewed Positioning for Dyad: proper alignment and head angle when positioning at the breast   Reviewed Frequency/Supply & Demand: offer the breast at each feeding, pump if baby is not latching and " "effective transferring milk.   Reviewed Infant:Cues and varied States of Awareness: watch for hunger cues, feed on demand. If baby seems satisfied at the breast (calm, relaxed sleeping, breasts are softer) no need to pump or supplement   Reviewed Infant Elimination: goal of 6+ wets and 2-3 stools per day   Reviewed Alternative/Artificial Feedings: paced bottle feeding technique demonstrated  Reviewed Mom/Breast care: gentle handling of the breast at all times, discussed lymphatic drainage and reverse pressure softening, as well as tips for healing sore nipples.    Reviewed Equipment: Hand pump and electric pump general guidance, Discussed proper flange fit, how to measure        Plan:      Reassurance provided that baby is growing well at this time. Cont with positioning adjustments and watch for signs of effective feeding. Pump only if wanting to replace feeding at the breast with bottle feeding or if latching becomes painful - collect what baby needs in 24 hours, limit \"bonus\" milk. Recommended block feeding to help down regulate production . Gentle handling of the breast at all times to preserve integrity.  Contact [facility] for breastfeeding support as needed or ongoing concerns with latching comfort and milk transfer. Follow up as needed if not seeing improvements in a few weeks.     I have spent 60 minutes with Patient and family today in which greater than 50% of this time was spent in counseling/coordination of care regarding Patient and family education.                                                                             "

## 2025-07-08 NOTE — PATIENT INSTRUCTIONS
"--Consider \"block feeding\" to down regulate your natural oversupply: Offering one breast for a 3 hour block of time and when switching to the second breast for the next 3 hour block of time, continue to alternate every 3 hours.    -Leave some \"fullness\" in the breast to tell your body to down regulate the production in the next few days. When pumping, collect only what he needs for his bottles.   -Cold compresses and ibuprofen discussed for engorgement management  -Sit comfortably reclined when nursing to allow Macklyn to be above the level of the breast and have more control of the flow from the breast.  -Follow up as needed.   "

## 2025-07-09 NOTE — PROGRESS NOTES
I have reviewed the notes, assessments, and/or procedures performed by Alondra Whyte RN, IBCLC, I concur with her/his documentation of Fuad Winston MD 07/08/25

## 2025-07-31 ENCOUNTER — OFFICE VISIT (OUTPATIENT)
Dept: FAMILY MEDICINE CLINIC | Facility: CLINIC | Age: 40
End: 2025-07-31
Payer: COMMERCIAL

## 2025-07-31 VITALS
DIASTOLIC BLOOD PRESSURE: 84 MMHG | WEIGHT: 237 LBS | BODY MASS INDEX: 38.09 KG/M2 | SYSTOLIC BLOOD PRESSURE: 120 MMHG | HEIGHT: 66 IN | OXYGEN SATURATION: 98 % | HEART RATE: 76 BPM

## 2025-07-31 DIAGNOSIS — F33.40 RECURRENT MAJOR DEPRESSIVE DISORDER, IN REMISSION (HCC): Primary | ICD-10-CM

## 2025-07-31 DIAGNOSIS — G43.839 INTRACTABLE MENSTRUAL MIGRAINE WITHOUT STATUS MIGRAINOSUS: ICD-10-CM

## 2025-07-31 DIAGNOSIS — E78.2 MIXED HYPERLIPIDEMIA: ICD-10-CM

## 2025-07-31 PROCEDURE — 99214 OFFICE O/P EST MOD 30 MIN: CPT

## 2025-08-06 DIAGNOSIS — F32.1 MODERATE MAJOR DEPRESSION, SINGLE EPISODE (HCC): ICD-10-CM

## 2025-08-07 RX ORDER — ESCITALOPRAM OXALATE 10 MG/1
10 TABLET ORAL DAILY
Qty: 90 TABLET | Refills: 1 | Status: SHIPPED | OUTPATIENT
Start: 2025-08-07

## 2025-08-15 ENCOUNTER — TELEPHONE (OUTPATIENT)
Age: 40
End: 2025-08-15

## (undated) DEVICE — SUT PLAIN 2-0 CTX 27 IN 872H

## (undated) DEVICE — SUT VICRYL 0 CT-1 27 IN J260H

## (undated) DEVICE — DRESSING MEPILEX AG BORDER 4 X 8 IN

## (undated) DEVICE — GLOVE PI ULTRA TOUCH SZ.7.5

## (undated) DEVICE — SUT VICRYL 2-0 SH 27 IN UNDYED J417H

## (undated) DEVICE — CHLORAPREP HI-LITE 26ML ORANGE

## (undated) DEVICE — MEDI-VAC YANKAUER SUCTION HANDLE W/STRAIGHT TIP & CONTROL VENT: Brand: CARDINAL HEALTH

## (undated) DEVICE — 3M™ STERI-STRIP™ REINFORCED ADHESIVE SKIN CLOSURES, R1547, 1/2 IN X 4 IN (12 MM X 100 MM), 6 STRIPS/ENVELOPE: Brand: 3M™ STERI-STRIP™

## (undated) DEVICE — SKIN MARKER DUAL TIP WITH RULER CAP, FLEXIBLE RULER AND LABELS: Brand: DEVON

## (undated) DEVICE — DRESSING MEPILEX AG BORDER 4 X 12 IN

## (undated) DEVICE — PACK C-SECTION PBDS

## (undated) DEVICE — GLOVE INDICATOR PI UNDERGLOVE SZ 7.5 BLUE

## (undated) DEVICE — Device